# Patient Record
Sex: MALE | NOT HISPANIC OR LATINO | Employment: UNEMPLOYED | URBAN - METROPOLITAN AREA
[De-identification: names, ages, dates, MRNs, and addresses within clinical notes are randomized per-mention and may not be internally consistent; named-entity substitution may affect disease eponyms.]

---

## 2018-01-21 ENCOUNTER — HOSPITAL ENCOUNTER (EMERGENCY)
Facility: HOSPITAL | Age: 53
Discharge: HOME | End: 2018-01-21

## 2018-01-22 ENCOUNTER — HOSPITAL ENCOUNTER (EMERGENCY)
Facility: HOSPITAL | Age: 53
Discharge: HOME | End: 2018-01-22
Attending: EMERGENCY MEDICINE | Admitting: EMERGENCY MEDICINE

## 2018-01-22 ENCOUNTER — APPOINTMENT (OUTPATIENT)
Dept: GENERAL RADIOLOGY | Facility: HOSPITAL | Age: 53
End: 2018-01-22

## 2018-01-22 VITALS
TEMPERATURE: 97.9 F | HEART RATE: 77 BPM | OXYGEN SATURATION: 98 % | HEIGHT: 75 IN | RESPIRATION RATE: 18 BRPM | BODY MASS INDEX: 37.05 KG/M2 | SYSTOLIC BLOOD PRESSURE: 126 MMHG | DIASTOLIC BLOOD PRESSURE: 70 MMHG | WEIGHT: 298 LBS

## 2018-01-22 DIAGNOSIS — S51.002S ELBOW WOUND, LEFT, SEQUELA: Primary | ICD-10-CM

## 2018-01-22 PROCEDURE — 73080 X-RAY EXAM OF ELBOW: CPT

## 2018-01-22 PROCEDURE — 99283 EMERGENCY DEPT VISIT LOW MDM: CPT

## 2018-01-22 PROCEDURE — 99284 EMERGENCY DEPT VISIT MOD MDM: CPT | Performed by: EMERGENCY MEDICINE

## 2018-01-22 RX ORDER — TEMAZEPAM 15 MG/1
15 CAPSULE ORAL NIGHTLY PRN
COMMUNITY

## 2018-01-22 RX ORDER — TRAZODONE HYDROCHLORIDE 50 MG/1
50 TABLET ORAL NIGHTLY
COMMUNITY

## 2018-01-22 RX ORDER — CLINDAMYCIN HYDROCHLORIDE 300 MG/1
300 CAPSULE ORAL 4 TIMES DAILY
Qty: 40 CAPSULE | Refills: 0 | Status: SHIPPED | OUTPATIENT
Start: 2018-01-22 | End: 2018-02-02 | Stop reason: HOSPADM

## 2018-01-22 RX ORDER — SPIRONOLACTONE 100 MG/1
100 TABLET, FILM COATED ORAL DAILY
COMMUNITY
End: 2018-02-02 | Stop reason: HOSPADM

## 2018-01-22 RX ORDER — TAMSULOSIN HYDROCHLORIDE 0.4 MG/1
1 CAPSULE ORAL NIGHTLY
COMMUNITY

## 2018-01-22 RX ORDER — B-COMPLEX WITH VITAMIN C
100 TABLET ORAL DAILY
COMMUNITY

## 2018-01-22 RX ORDER — LEVOFLOXACIN 500 MG/1
500 TABLET, FILM COATED ORAL DAILY
Status: ON HOLD | COMMUNITY
End: 2018-01-27

## 2018-01-22 RX ORDER — GABAPENTIN 600 MG/1
600 TABLET ORAL NIGHTLY
COMMUNITY

## 2018-01-22 RX ORDER — PRENATAL VIT NO.126/IRON/FOLIC 28MG-0.8MG
TABLET ORAL DAILY
COMMUNITY

## 2018-01-22 RX ORDER — TORSEMIDE 20 MG/1
20 TABLET ORAL DAILY
COMMUNITY
End: 2018-02-02 | Stop reason: HOSPADM

## 2018-01-22 RX ORDER — PREDNISONE 10 MG/1
10 TABLET ORAL DAILY
Status: ON HOLD | COMMUNITY
End: 2018-01-27

## 2018-01-22 RX ORDER — THIAMINE MONONITRATE (VIT B1) 100 MG
100 TABLET ORAL DAILY
COMMUNITY

## 2018-01-22 NOTE — ED NOTES
Appt set up for Friday, 1/26 at 8:05am.  Address:  [address], [facility], [place] [address].  Phone [phone]     Satinder Miller  01/22/18 8927

## 2018-01-22 NOTE — ED PROVIDER NOTES
Subjective   History of Present Illness  History of Present Illness    Chief complaint: Elbow wound and possible gangrene    Location: Left olecranon    Quality/Severity:  Moderate    Timing/Duration: Initial injury occurred in November.    Modifying Factors: Patient has been admitted several times to [facility] for what sounds like cellulitis of the left elbow and received intravenous antibiotics.  Patient was discharged to rehabilitation and just recently went home.  Patient has been getting home health care where the wound has been packed with iodoform.  Last week the patient did have an outpatient x-ray that revealed subcutaneous emphysema.  There was some concern that this may be due to an infectious process.    Associated Symptoms: Redness and swelling    Narrative: The patient is a 53-year-old white male who presents as noted above.  Again, the patient initially fell in November and had a relatively minor wound to his left elbow.  From what can be ascertained the patient did develop a cellulitis and has been admitted to [facility] and received IV antibiotics.  After a 3 week stay in rehabilitation the patient is now home and is getting home health care help.  Last week and x-ray revealed some subcutaneous emphysema and there was some concern for an infectious process.  The patient himself does not relate any increased pain, erythema or swelling.  However, there has been a slight increase in yellow discharge.  No reported fever.    Review of Systems   Constitutional: Negative for activity change, appetite change, fatigue and fever.   Musculoskeletal: Positive for joint swelling (left elbow and with wound over the olecranon).   All other systems reviewed and are negative.      Past Medical History:   Diagnosis Date   • Chronic back pain    • Chronic neck pain    • Cirrhosis    • GERD (gastroesophageal reflux disease)    • Hypertension        Allergies   Allergen Reactions   • Lisinopril  Angioedema       Past Surgical History:   Procedure Laterality Date   • CATARACT EXTRACTION, BILATERAL     • COLONOSCOPY     • FINGER SURGERY Left     5TH FINGER   • TONSILLECTOMY         History reviewed. No pertinent family history.    Social History     Social History   • Marital status: Single     Spouse name: N/A   • Number of children: N/A   • Years of education: N/A     Social History Main Topics   • Smoking status: Never Smoker   • Smokeless tobacco: Current User     Types: Snuff   • Alcohol use No      Comment: heavy drinker that stopped in 10/2017   • Drug use: No   • Sexual activity: Defer     Other Topics Concern   • None     Social History Narrative   • None           Objective   Physical Exam   Constitutional: He is oriented to person, place, and time.   The patient is an obese, 53-year-old, white male in no acute distress.   HENT:   Head: Normocephalic and atraumatic.   Eyes: Conjunctivae and EOM are normal.   Musculoskeletal:   Over the left olecranon there is a 2.5 cm open wound that has an intact packing of iodoform gauze.  Mild erythema and heat associated with the wound that has been left to heal by secondary intention.  Full range of motion is maintained and the neuromuscular vascular exams are intact distally.   Neurological: He is alert and oriented to person, place, and time.   Nursing note and vitals reviewed.      Procedures  Xr Elbow 3+ View Left    Result Date: 1/22/2018  Narrative: INDICATION: Fall 3 months ago. Open wound the left elbow. Left elbow pain..  COMPARISON: None available.  FINDINGS: 3 views of the left elbow.  No fracture, dislocation, or effusion. There is some subcutaneous gas underlying a left elbow laceration. Patient has reported history of an open wound. No osseous erosions.  No foreign body.      Impression: Small volume subcutaneous gas associated with the superficial wound/laceration. The gas is fairly localized and does not track into the adjacent soft tissues.  Correlate for any evidence of abscess.  This report was finalized on 1/22/2018 12:01 PM by Dr. Arnie Parson MD.           ED Course  ED Course   Comment By Time   The packing was removed from the wound and a small amount of yellowish discharge was present.  No odor present.  Wound edges were pink and well granulated. Nils Pendleton MD 01/22 1400   Pertinent case findings were discussed with the general surgeon, , who agreed follow-up with this patient in her office.  Antibiotics will be changed to clindamycin.  I personally repacked the wound with a generous amount of quarter-inch iodoform gauze.  Wound care discussed with patient and significant other.  Warnings also discussed. Nils Pendleton MD 01/22 1402                  MDM  Number of Diagnoses or Management Options  Elbow wound, left, sequela: new and does not require workup     Amount and/or Complexity of Data Reviewed  Tests in the radiology section of CPT®: ordered and reviewed  Discuss the patient with other providers: yes  Independent visualization of images, tracings, or specimens: yes    Risk of Complications, Morbidity, and/or Mortality  Presenting problems: high  Diagnostic procedures: moderate  Management options: moderate    Patient Progress  Patient progress: stable    Labs this visit  Lab Results (last 24 hours)     ** No results found for the last 24 hours. **        Prescribed on discharge             Medication List      New Prescriptions          clindamycin 300 MG capsule   Commonly known as:  CLEOCIN   Take 1 capsule by mouth 4 (Four) Times a Day for 10 days.         Stop          amLODIPine 5 MG tablet   Commonly known as:  NORVASC       atenolol 100 MG tablet   Commonly known as:  TENORMIN       furosemide 20 MG tablet   Commonly known as:  LASIX       levoFLOXacin 500 MG tablet   Commonly known as:  LEVAQUIN       Loratadine 10 MG capsule       losartan 50 MG tablet   Commonly known as:  COZAAR        oxyCODONE-acetaminophen  MG per tablet   Commonly known as:  PERCOCET           All lab results, imaging results and other tests were reviewed by Nils Pendleton MD and unless otherwise specified were found to be unremarkable.      Final diagnoses:   Elbow wound, left, sequela            Nils Pendleton MD  01/22/18 140

## 2018-01-27 ENCOUNTER — HOSPITAL ENCOUNTER (INPATIENT)
Facility: HOSPITAL | Age: 53
LOS: 6 days | Discharge: NURSING, REHAB OR LONG-TERM CARE | End: 2018-02-02
Attending: EMERGENCY MEDICINE | Admitting: HOSPITALIST

## 2018-01-27 DIAGNOSIS — S51.002S ELBOW WOUND, LEFT, SEQUELA: ICD-10-CM

## 2018-01-27 DIAGNOSIS — R18.8 CIRRHOSIS OF LIVER WITH ASCITES, UNSPECIFIED HEPATIC CIRRHOSIS TYPE (HCC): ICD-10-CM

## 2018-01-27 DIAGNOSIS — E87.6 HYPOKALEMIA: ICD-10-CM

## 2018-01-27 DIAGNOSIS — E87.1 HYPONATREMIA: ICD-10-CM

## 2018-01-27 DIAGNOSIS — R19.7 DIARRHEA, UNSPECIFIED TYPE: Primary | ICD-10-CM

## 2018-01-27 DIAGNOSIS — K74.60 CIRRHOSIS OF LIVER WITH ASCITES, UNSPECIFIED HEPATIC CIRRHOSIS TYPE (HCC): ICD-10-CM

## 2018-01-27 LAB
ALBUMIN SERPL-MCNC: 2.2 G/DL (ref 3.5–5.2)
ALBUMIN/GLOB SERPL: 0.6 G/DL
ALP SERPL-CCNC: 207 U/L (ref 40–129)
ALT SERPL W P-5'-P-CCNC: 29 U/L (ref 5–41)
AMMONIA BLD-SCNC: 60 UMOL/L (ref 16–60)
ANION GAP SERPL CALCULATED.3IONS-SCNC: 10.8 MMOL/L
ANION GAP SERPL CALCULATED.3IONS-SCNC: 11.2 MMOL/L
ANISOCYTOSIS BLD QL: NORMAL
APTT PPP: 43.4 SECONDS (ref 24.3–38.1)
AST SERPL-CCNC: 61 U/L (ref 5–40)
BASOPHILS # BLD AUTO: 0.03 10*3/MM3 (ref 0–0.2)
BASOPHILS NFR BLD AUTO: 0.4 % (ref 0–2)
BILIRUB SERPL-MCNC: 5 MG/DL (ref 0.2–1.2)
BILIRUB UR QL STRIP: ABNORMAL
BUN BLD-MCNC: 15 MG/DL (ref 6–20)
BUN BLD-MCNC: 17 MG/DL (ref 6–20)
BUN/CREAT SERPL: 11.9 (ref 7–25)
BUN/CREAT SERPL: 12.6 (ref 7–25)
C DIFF GDH STL QL: NEGATIVE
CALCIUM SPEC-SCNC: 7.6 MG/DL (ref 8.6–10.5)
CALCIUM SPEC-SCNC: 7.7 MG/DL (ref 8.6–10.5)
CHLORIDE SERPL-SCNC: 90 MMOL/L (ref 98–107)
CHLORIDE SERPL-SCNC: 91 MMOL/L (ref 98–107)
CLARITY UR: CLEAR
CO2 SERPL-SCNC: 23.8 MMOL/L (ref 22–29)
CO2 SERPL-SCNC: 24.2 MMOL/L (ref 22–29)
COLOR UR: ABNORMAL
CREAT BLD-MCNC: 1.19 MG/DL (ref 0.76–1.27)
CREAT BLD-MCNC: 1.43 MG/DL (ref 0.76–1.27)
D-LACTATE SERPL-SCNC: 1.6 MMOL/L (ref 0.5–2)
D-LACTATE SERPL-SCNC: 2.1 MMOL/L (ref 0.5–2)
DEPRECATED RDW RBC AUTO: 63.6 FL (ref 37–54)
EOSINOPHIL # BLD AUTO: 0.16 10*3/MM3 (ref 0.1–0.3)
EOSINOPHIL NFR BLD AUTO: 2.3 % (ref 0–4)
ERYTHROCYTE [DISTWIDTH] IN BLOOD BY AUTOMATED COUNT: 19 % (ref 11.5–14.5)
ERYTHROCYTE [SEDIMENTATION RATE] IN BLOOD: 55 MM/HR (ref 0–20)
FLUAV AG NPH QL: NEGATIVE
FLUBV AG NPH QL IA: NEGATIVE
GFR SERPL CREATININE-BSD FRML MDRD: 52 ML/MIN/1.73
GFR SERPL CREATININE-BSD FRML MDRD: 64 ML/MIN/1.73
GLOBULIN UR ELPH-MCNC: 3.9 GM/DL
GLUCOSE BLD-MCNC: 134 MG/DL (ref 65–99)
GLUCOSE BLD-MCNC: 174 MG/DL (ref 65–99)
GLUCOSE UR STRIP-MCNC: NEGATIVE MG/DL
HCT VFR BLD AUTO: 26.9 % (ref 42–52)
HGB BLD-MCNC: 9.7 G/DL (ref 14–18)
HGB UR QL STRIP.AUTO: NEGATIVE
HOLD SPECIMEN: NORMAL
HYPOCHROMIA BLD QL: NORMAL
IMM GRANULOCYTES # BLD: 0.03 10*3/MM3 (ref 0–0.03)
IMM GRANULOCYTES NFR BLD: 0.4 % (ref 0–0.5)
INR PPP: 2.16 (ref 0.9–1.1)
KETONES UR QL STRIP: NEGATIVE
LARGE PLATELETS: NORMAL
LEUKOCYTE ESTERASE UR QL STRIP.AUTO: NEGATIVE
LYMPHOCYTES # BLD AUTO: 2.15 10*3/MM3 (ref 0.6–4.8)
LYMPHOCYTES NFR BLD AUTO: 31.3 % (ref 20–45)
MACROCYTES BLD QL SMEAR: NORMAL
MCH RBC QN AUTO: 32.7 PG (ref 27–31)
MCHC RBC AUTO-ENTMCNC: 36.1 G/DL (ref 31–37)
MCV RBC AUTO: 90.6 FL (ref 80–94)
MONOCYTES # BLD AUTO: 1.4 10*3/MM3 (ref 0–1)
MONOCYTES NFR BLD AUTO: 20.4 % (ref 3–8)
NEUTROPHILS # BLD AUTO: 3.09 10*3/MM3 (ref 1.5–8.3)
NEUTROPHILS NFR BLD AUTO: 45.2 % (ref 45–70)
NITRITE UR QL STRIP: NEGATIVE
NRBC BLD MANUAL-RTO: 0 /100 WBC (ref 0–0)
PH UR STRIP.AUTO: 6 [PH] (ref 4.5–8)
PLATELET # BLD AUTO: 76 10*3/MM3 (ref 140–500)
PMV BLD AUTO: 9.5 FL (ref 7.4–10.4)
POTASSIUM BLD-SCNC: 2.5 MMOL/L (ref 3.5–5.2)
POTASSIUM BLD-SCNC: 2.7 MMOL/L (ref 3.5–5.2)
PROT SERPL-MCNC: 6.1 G/DL (ref 6–8.5)
PROT UR QL STRIP: NEGATIVE
PROTHROMBIN TIME: 24.4 SECONDS (ref 12.1–15)
RBC # BLD AUTO: 2.97 10*6/MM3 (ref 4.7–6.1)
SMALL PLATELETS BLD QL SMEAR: NORMAL
SODIUM BLD-SCNC: 125 MMOL/L (ref 136–145)
SODIUM BLD-SCNC: 126 MMOL/L (ref 136–145)
SP GR UR STRIP: 1.01 (ref 1–1.03)
TARGETS BLD QL SMEAR: NORMAL
UROBILINOGEN UR QL STRIP: ABNORMAL
WBC MORPH BLD: NORMAL
WBC NRBC COR # BLD: 6.86 10*3/MM3 (ref 4.8–10.8)

## 2018-01-27 PROCEDURE — 87449 NOS EACH ORGANISM AG IA: CPT | Performed by: EMERGENCY MEDICINE

## 2018-01-27 PROCEDURE — 99253 IP/OBS CNSLTJ NEW/EST LOW 45: CPT | Performed by: SURGERY

## 2018-01-27 PROCEDURE — 85730 THROMBOPLASTIN TIME PARTIAL: CPT | Performed by: EMERGENCY MEDICINE

## 2018-01-27 PROCEDURE — 99291 CRITICAL CARE FIRST HOUR: CPT | Performed by: EMERGENCY MEDICINE

## 2018-01-27 PROCEDURE — 85610 PROTHROMBIN TIME: CPT | Performed by: EMERGENCY MEDICINE

## 2018-01-27 PROCEDURE — 87804 INFLUENZA ASSAY W/OPTIC: CPT | Performed by: EMERGENCY MEDICINE

## 2018-01-27 PROCEDURE — 81003 URINALYSIS AUTO W/O SCOPE: CPT | Performed by: EMERGENCY MEDICINE

## 2018-01-27 PROCEDURE — 25810000003 SODIUM CHLORIDE 0.9 % WITH KCL 40 MEQ/L 40-0.9 MEQ/L-% SOLUTION

## 2018-01-27 PROCEDURE — 87324 CLOSTRIDIUM AG IA: CPT | Performed by: EMERGENCY MEDICINE

## 2018-01-27 PROCEDURE — 25810000003 SODIUM CHLORIDE 0.9 % WITH KCL 40 MEQ/L 40-0.9 MEQ/L-% SOLUTION: Performed by: EMERGENCY MEDICINE

## 2018-01-27 PROCEDURE — 83605 ASSAY OF LACTIC ACID: CPT | Performed by: EMERGENCY MEDICINE

## 2018-01-27 PROCEDURE — 80053 COMPREHEN METABOLIC PANEL: CPT | Performed by: EMERGENCY MEDICINE

## 2018-01-27 PROCEDURE — 85025 COMPLETE CBC W/AUTO DIFF WBC: CPT | Performed by: EMERGENCY MEDICINE

## 2018-01-27 PROCEDURE — 85007 BL SMEAR W/DIFF WBC COUNT: CPT | Performed by: EMERGENCY MEDICINE

## 2018-01-27 PROCEDURE — 86140 C-REACTIVE PROTEIN: CPT | Performed by: HOSPITALIST

## 2018-01-27 PROCEDURE — 85652 RBC SED RATE AUTOMATED: CPT | Performed by: SURGERY

## 2018-01-27 PROCEDURE — 99284 EMERGENCY DEPT VISIT MOD MDM: CPT

## 2018-01-27 PROCEDURE — 25010000002 PIPERACILLIN-TAZOBACTAM: Performed by: HOSPITALIST

## 2018-01-27 PROCEDURE — 82140 ASSAY OF AMMONIA: CPT | Performed by: EMERGENCY MEDICINE

## 2018-01-27 PROCEDURE — 25010000002 VANCOMYCIN PER 500 MG: Performed by: HOSPITALIST

## 2018-01-27 PROCEDURE — 99222 1ST HOSP IP/OBS MODERATE 55: CPT | Performed by: INTERNAL MEDICINE

## 2018-01-27 RX ORDER — TEMAZEPAM 15 MG/1
15 CAPSULE ORAL NIGHTLY PRN
Status: DISCONTINUED | OUTPATIENT
Start: 2018-01-27 | End: 2018-02-02 | Stop reason: HOSPADM

## 2018-01-27 RX ORDER — SODIUM CHLORIDE AND POTASSIUM CHLORIDE 150; 450 MG/100ML; MG/100ML
100 INJECTION, SOLUTION INTRAVENOUS CONTINUOUS
Status: DISCONTINUED | OUTPATIENT
Start: 2018-01-27 | End: 2018-01-27

## 2018-01-27 RX ORDER — SODIUM CHLORIDE 9 MG/ML
INJECTION, SOLUTION INTRAVENOUS
Status: DISPENSED
Start: 2018-01-27 | End: 2018-01-28

## 2018-01-27 RX ORDER — SPIRONOLACTONE 100 MG/1
100 TABLET, FILM COATED ORAL DAILY
Status: DISCONTINUED | OUTPATIENT
Start: 2018-01-27 | End: 2018-01-30

## 2018-01-27 RX ORDER — TRAZODONE HYDROCHLORIDE 50 MG/1
50 TABLET ORAL NIGHTLY
Status: DISCONTINUED | OUTPATIENT
Start: 2018-01-27 | End: 2018-02-02 | Stop reason: HOSPADM

## 2018-01-27 RX ORDER — POTASSIUM CHLORIDE 20 MEQ/1
40 TABLET, EXTENDED RELEASE ORAL EVERY 4 HOURS
Status: COMPLETED | OUTPATIENT
Start: 2018-01-27 | End: 2018-01-27

## 2018-01-27 RX ORDER — PRENATAL VIT NO.126/IRON/FOLIC 28MG-0.8MG
1 TABLET ORAL DAILY
Status: DISCONTINUED | OUTPATIENT
Start: 2018-01-27 | End: 2018-01-28 | Stop reason: CLARIF

## 2018-01-27 RX ORDER — PANTOPRAZOLE SODIUM 40 MG/1
40 TABLET, DELAYED RELEASE ORAL DAILY
Status: DISCONTINUED | OUTPATIENT
Start: 2018-01-27 | End: 2018-02-02 | Stop reason: HOSPADM

## 2018-01-27 RX ORDER — SODIUM CHLORIDE AND POTASSIUM CHLORIDE 300; 900 MG/100ML; MG/100ML
100 INJECTION, SOLUTION INTRAVENOUS CONTINUOUS
Status: DISCONTINUED | OUTPATIENT
Start: 2018-01-27 | End: 2018-01-28

## 2018-01-27 RX ORDER — TAMSULOSIN HYDROCHLORIDE 0.4 MG/1
0.4 CAPSULE ORAL NIGHTLY
Status: DISCONTINUED | OUTPATIENT
Start: 2018-01-27 | End: 2018-02-02 | Stop reason: HOSPADM

## 2018-01-27 RX ORDER — SODIUM CHLORIDE AND POTASSIUM CHLORIDE 300; 900 MG/100ML; MG/100ML
INJECTION, SOLUTION INTRAVENOUS
Status: COMPLETED
Start: 2018-01-27 | End: 2018-01-27

## 2018-01-27 RX ORDER — LACTULOSE 20 G/30ML
10 SOLUTION ORAL 3 TIMES DAILY
Status: DISCONTINUED | OUTPATIENT
Start: 2018-01-27 | End: 2018-01-27

## 2018-01-27 RX ORDER — GABAPENTIN 300 MG/1
600 CAPSULE ORAL NIGHTLY
Status: DISCONTINUED | OUTPATIENT
Start: 2018-01-27 | End: 2018-02-02 | Stop reason: HOSPADM

## 2018-01-27 RX ORDER — LACTULOSE 10 G/15ML
10 SOLUTION ORAL 3 TIMES DAILY
COMMUNITY
End: 2018-02-02 | Stop reason: HOSPADM

## 2018-01-27 RX ORDER — VITAMIN B COMPLEX
1 CAPSULE ORAL DAILY
Status: DISCONTINUED | OUTPATIENT
Start: 2018-01-27 | End: 2018-01-28 | Stop reason: CLARIF

## 2018-01-27 RX ORDER — PIPERACILLIN SODIUM, TAZOBACTAM SODIUM 3; .375 G/15ML; G/15ML
INJECTION, POWDER, LYOPHILIZED, FOR SOLUTION INTRAVENOUS
Status: DISPENSED
Start: 2018-01-27 | End: 2018-01-28

## 2018-01-27 RX ORDER — FOLIC ACID 1 MG/1
1 TABLET ORAL DAILY
Status: DISCONTINUED | OUTPATIENT
Start: 2018-01-27 | End: 2018-02-02 | Stop reason: HOSPADM

## 2018-01-27 RX ORDER — SPIRONOLACTONE 25 MG/1
TABLET ORAL
Status: COMPLETED
Start: 2018-01-27 | End: 2018-01-27

## 2018-01-27 RX ORDER — SODIUM CHLORIDE 0.9 % (FLUSH) 0.9 %
1-10 SYRINGE (ML) INJECTION AS NEEDED
Status: DISCONTINUED | OUTPATIENT
Start: 2018-01-27 | End: 2018-02-02 | Stop reason: HOSPADM

## 2018-01-27 RX ORDER — TORSEMIDE 20 MG/1
20 TABLET ORAL DAILY
Status: DISCONTINUED | OUTPATIENT
Start: 2018-01-27 | End: 2018-01-28

## 2018-01-27 RX ORDER — SODIUM CHLORIDE 9 MG/ML
40 INJECTION, SOLUTION INTRAVENOUS AS NEEDED
Status: DISCONTINUED | OUTPATIENT
Start: 2018-01-27 | End: 2018-02-02 | Stop reason: HOSPADM

## 2018-01-27 RX ORDER — SODIUM CHLORIDE 0.9 % (FLUSH) 0.9 %
10 SYRINGE (ML) INJECTION AS NEEDED
Status: DISCONTINUED | OUTPATIENT
Start: 2018-01-27 | End: 2018-02-02 | Stop reason: HOSPADM

## 2018-01-27 RX ORDER — TORSEMIDE 10 MG/1
TABLET ORAL
Status: COMPLETED
Start: 2018-01-27 | End: 2018-01-27

## 2018-01-27 RX ORDER — THIAMINE MONONITRATE (VIT B1) 100 MG
100 TABLET ORAL DAILY
Status: DISCONTINUED | OUTPATIENT
Start: 2018-01-27 | End: 2018-02-02 | Stop reason: HOSPADM

## 2018-01-27 RX ADMIN — TAMSULOSIN HYDROCHLORIDE 0.4 MG: 0.4 CAPSULE ORAL at 20:38

## 2018-01-27 RX ADMIN — TORSEMIDE 20 MG: 10 TABLET ORAL at 18:47

## 2018-01-27 RX ADMIN — TORSEMIDE 20 MG: 20 TABLET ORAL at 18:47

## 2018-01-27 RX ADMIN — POTASSIUM CHLORIDE AND SODIUM CHLORIDE 100 ML/HR: 900; 300 INJECTION, SOLUTION INTRAVENOUS at 20:25

## 2018-01-27 RX ADMIN — SODIUM CHLORIDE AND POTASSIUM CHLORIDE 100 ML/HR: 9; 2.98 INJECTION, SOLUTION INTRAVENOUS at 09:32

## 2018-01-27 RX ADMIN — GABAPENTIN 600 MG: 300 CAPSULE ORAL at 20:25

## 2018-01-27 RX ADMIN — SPIRONOLACTONE 100 MG: 100 TABLET, FILM COATED ORAL at 18:48

## 2018-01-27 RX ADMIN — PIPERACILLIN SODIUM,TAZOBACTAM SODIUM 3.38 G: 3; .375 INJECTION, POWDER, FOR SOLUTION INTRAVENOUS at 20:11

## 2018-01-27 RX ADMIN — SPIRONOLACTONE 100 MG: 25 TABLET ORAL at 18:48

## 2018-01-27 RX ADMIN — Medication 100 MG: at 18:49

## 2018-01-27 RX ADMIN — POTASSIUM CHLORIDE 40 MEQ: 1500 TABLET, EXTENDED RELEASE ORAL at 23:25

## 2018-01-27 RX ADMIN — POTASSIUM CHLORIDE 40 MEQ: 1500 TABLET, EXTENDED RELEASE ORAL at 18:49

## 2018-01-27 RX ADMIN — SODIUM CHLORIDE AND POTASSIUM CHLORIDE 100 ML/HR: 9; 2.98 INJECTION, SOLUTION INTRAVENOUS at 20:25

## 2018-01-27 RX ADMIN — FOLIC ACID 1 MG: 1 TABLET ORAL at 18:49

## 2018-01-27 RX ADMIN — PANTOPRAZOLE SODIUM 40 MG: 40 TABLET, DELAYED RELEASE ORAL at 18:49

## 2018-01-27 RX ADMIN — TRAZODONE HYDROCHLORIDE 50 MG: 50 TABLET ORAL at 20:38

## 2018-01-27 RX ADMIN — VANCOMYCIN HYDROCHLORIDE 2750 MG: 1 INJECTION, POWDER, LYOPHILIZED, FOR SOLUTION INTRAVENOUS at 21:05

## 2018-01-28 LAB
ALBUMIN SERPL-MCNC: 2.1 G/DL (ref 3.5–5.2)
ALBUMIN/GLOB SERPL: 0.5 G/DL
ALP SERPL-CCNC: 215 U/L (ref 40–129)
ALT SERPL W P-5'-P-CCNC: 29 U/L (ref 5–41)
ANION GAP SERPL CALCULATED.3IONS-SCNC: 12.7 MMOL/L
AST SERPL-CCNC: 60 U/L (ref 5–40)
BASOPHILS # BLD AUTO: 0.05 10*3/MM3 (ref 0–0.2)
BASOPHILS NFR BLD AUTO: 0.6 % (ref 0–2)
BILIRUB SERPL-MCNC: 5 MG/DL (ref 0.2–1.2)
BUN BLD-MCNC: 18 MG/DL (ref 6–20)
BUN/CREAT SERPL: 12.5 (ref 7–25)
CALCIUM SPEC-SCNC: 7.7 MG/DL (ref 8.6–10.5)
CHLORIDE SERPL-SCNC: 92 MMOL/L (ref 98–107)
CO2 SERPL-SCNC: 22.3 MMOL/L (ref 22–29)
CREAT BLD-MCNC: 1.44 MG/DL (ref 0.76–1.27)
CRP SERPL-MCNC: 2.86 MG/DL (ref 0–0.5)
DEPRECATED RDW RBC AUTO: 62.3 FL (ref 37–54)
EOSINOPHIL # BLD AUTO: 0.16 10*3/MM3 (ref 0.1–0.3)
EOSINOPHIL NFR BLD AUTO: 1.9 % (ref 0–4)
ERYTHROCYTE [DISTWIDTH] IN BLOOD BY AUTOMATED COUNT: 18.8 % (ref 11.5–14.5)
GFR SERPL CREATININE-BSD FRML MDRD: 52 ML/MIN/1.73
GLOBULIN UR ELPH-MCNC: 3.9 GM/DL
GLUCOSE BLD-MCNC: 158 MG/DL (ref 65–99)
HCT VFR BLD AUTO: 27.1 % (ref 42–52)
HGB BLD-MCNC: 9.7 G/DL (ref 14–18)
IMM GRANULOCYTES # BLD: 0.06 10*3/MM3 (ref 0–0.03)
IMM GRANULOCYTES NFR BLD: 0.7 % (ref 0–0.5)
LYMPHOCYTES # BLD AUTO: 2.85 10*3/MM3 (ref 0.6–4.8)
LYMPHOCYTES NFR BLD AUTO: 33.3 % (ref 20–45)
MAGNESIUM SERPL-MCNC: 1.7 MG/DL (ref 1.7–2.5)
MCH RBC QN AUTO: 32.8 PG (ref 27–31)
MCHC RBC AUTO-ENTMCNC: 35.8 G/DL (ref 31–37)
MCV RBC AUTO: 91.6 FL (ref 80–94)
MONOCYTES # BLD AUTO: 1.19 10*3/MM3 (ref 0–1)
MONOCYTES NFR BLD AUTO: 13.9 % (ref 3–8)
NEUTROPHILS # BLD AUTO: 4.26 10*3/MM3 (ref 1.5–8.3)
NEUTROPHILS NFR BLD AUTO: 49.6 % (ref 45–70)
NRBC BLD MANUAL-RTO: 0 /100 WBC (ref 0–0)
PHOSPHATE SERPL-MCNC: 2.5 MG/DL (ref 2.7–4.5)
PLATELET # BLD AUTO: 75 10*3/MM3 (ref 140–500)
PMV BLD AUTO: 10.2 FL (ref 7.4–10.4)
POTASSIUM BLD-SCNC: 3.4 MMOL/L (ref 3.5–5.2)
PROT SERPL-MCNC: 6 G/DL (ref 6–8.5)
RBC # BLD AUTO: 2.96 10*6/MM3 (ref 4.7–6.1)
SODIUM BLD-SCNC: 127 MMOL/L (ref 136–145)
WBC NRBC COR # BLD: 8.57 10*3/MM3 (ref 4.8–10.8)

## 2018-01-28 PROCEDURE — 25010000002 MAGNESIUM SULFATE 2 GM/50ML SOLUTION: Performed by: INTERNAL MEDICINE

## 2018-01-28 PROCEDURE — 84100 ASSAY OF PHOSPHORUS: CPT | Performed by: HOSPITALIST

## 2018-01-28 PROCEDURE — 25010000002 PIPERACILLIN-TAZOBACTAM: Performed by: HOSPITALIST

## 2018-01-28 PROCEDURE — 85025 COMPLETE CBC W/AUTO DIFF WBC: CPT | Performed by: HOSPITALIST

## 2018-01-28 PROCEDURE — 25010000002 FUROSEMIDE PER 20 MG: Performed by: INTERNAL MEDICINE

## 2018-01-28 PROCEDURE — 83735 ASSAY OF MAGNESIUM: CPT | Performed by: HOSPITALIST

## 2018-01-28 PROCEDURE — 80053 COMPREHEN METABOLIC PANEL: CPT | Performed by: HOSPITALIST

## 2018-01-28 PROCEDURE — 99232 SBSQ HOSP IP/OBS MODERATE 35: CPT | Performed by: INTERNAL MEDICINE

## 2018-01-28 PROCEDURE — 25810000003 SODIUM CHLORIDE 0.9 % WITH KCL 40 MEQ/L 40-0.9 MEQ/L-% SOLUTION: Performed by: EMERGENCY MEDICINE

## 2018-01-28 RX ORDER — POTASSIUM CHLORIDE 20 MEQ/1
40 TABLET, EXTENDED RELEASE ORAL AS NEEDED
Status: DISCONTINUED | OUTPATIENT
Start: 2018-01-28 | End: 2018-02-02 | Stop reason: HOSPADM

## 2018-01-28 RX ORDER — MAGNESIUM SULFATE HEPTAHYDRATE 40 MG/ML
2 INJECTION, SOLUTION INTRAVENOUS AS NEEDED
Status: DISCONTINUED | OUTPATIENT
Start: 2018-01-28 | End: 2018-02-02 | Stop reason: HOSPADM

## 2018-01-28 RX ORDER — MAGNESIUM SULFATE HEPTAHYDRATE 40 MG/ML
2 INJECTION, SOLUTION INTRAVENOUS ONCE
Status: COMPLETED | OUTPATIENT
Start: 2018-01-28 | End: 2018-01-28

## 2018-01-28 RX ORDER — PRENATAL VIT/IRON FUM/FOLIC AC 27MG-0.8MG
1 TABLET ORAL DAILY
Status: DISCONTINUED | OUTPATIENT
Start: 2018-01-28 | End: 2018-02-02 | Stop reason: HOSPADM

## 2018-01-28 RX ORDER — FUROSEMIDE 10 MG/ML
40 INJECTION INTRAMUSCULAR; INTRAVENOUS ONCE
Status: COMPLETED | OUTPATIENT
Start: 2018-01-28 | End: 2018-01-28

## 2018-01-28 RX ORDER — TORSEMIDE 20 MG/1
40 TABLET ORAL DAILY
Status: DISCONTINUED | OUTPATIENT
Start: 2018-01-29 | End: 2018-01-29

## 2018-01-28 RX ORDER — MAGNESIUM SULFATE HEPTAHYDRATE 40 MG/ML
4 INJECTION, SOLUTION INTRAVENOUS AS NEEDED
Status: DISCONTINUED | OUTPATIENT
Start: 2018-01-28 | End: 2018-02-02 | Stop reason: HOSPADM

## 2018-01-28 RX ORDER — POTASSIUM CHLORIDE 7.45 MG/ML
10 INJECTION INTRAVENOUS
Status: DISCONTINUED | OUTPATIENT
Start: 2018-01-28 | End: 2018-02-02 | Stop reason: HOSPADM

## 2018-01-28 RX ORDER — PIPERACILLIN SODIUM, TAZOBACTAM SODIUM 3; .375 G/15ML; G/15ML
INJECTION, POWDER, LYOPHILIZED, FOR SOLUTION INTRAVENOUS
Status: DISPENSED
Start: 2018-01-28 | End: 2018-01-28

## 2018-01-28 RX ORDER — POTASSIUM CHLORIDE 20 MEQ/1
40 TABLET, EXTENDED RELEASE ORAL ONCE
Status: COMPLETED | OUTPATIENT
Start: 2018-01-28 | End: 2018-01-28

## 2018-01-28 RX ORDER — MAGNESIUM SULFATE 1 G/100ML
1 INJECTION INTRAVENOUS AS NEEDED
Status: DISCONTINUED | OUTPATIENT
Start: 2018-01-28 | End: 2018-02-02 | Stop reason: HOSPADM

## 2018-01-28 RX ORDER — POTASSIUM CHLORIDE 1.5 G/1.77G
40 POWDER, FOR SOLUTION ORAL AS NEEDED
Status: DISCONTINUED | OUTPATIENT
Start: 2018-01-28 | End: 2018-02-02 | Stop reason: HOSPADM

## 2018-01-28 RX ORDER — SODIUM CHLORIDE 9 MG/ML
INJECTION, SOLUTION INTRAVENOUS
Status: DISPENSED
Start: 2018-01-28 | End: 2018-01-28

## 2018-01-28 RX ADMIN — TORSEMIDE 20 MG: 20 TABLET ORAL at 08:29

## 2018-01-28 RX ADMIN — FOLIC ACID 1 MG: 1 TABLET ORAL at 08:29

## 2018-01-28 RX ADMIN — PIPERACILLIN SODIUM,TAZOBACTAM SODIUM 3.38 G: 3; .375 INJECTION, POWDER, FOR SOLUTION INTRAVENOUS at 11:44

## 2018-01-28 RX ADMIN — ASCORBIC ACID, THIAMINE MONONITRATE,RIBOFLAVIN, NIACINAMIDE, PYRIDOXINE HYDROCHLORIDE, FOLIC ACID, CYANOCOBALAMIN, BIOTIN, CALCIUM PANTOTHENATE, 1 MG: 100; 1.5; 1.7; 20; 10; 1; 6000; 150000; 5 CAPSULE, LIQUID FILLED ORAL at 08:29

## 2018-01-28 RX ADMIN — PIPERACILLIN SODIUM,TAZOBACTAM SODIUM 3.38 G: 3; .375 INJECTION, POWDER, FOR SOLUTION INTRAVENOUS at 03:20

## 2018-01-28 RX ADMIN — POTASSIUM PHOSPHATE, MONOBASIC AND POTASSIUM PHOSPHATE, DIBASIC 15 MMOL: 224; 236 INJECTION, SOLUTION INTRAVENOUS at 23:10

## 2018-01-28 RX ADMIN — RIFAXIMIN 600 MG: 200 TABLET ORAL at 08:29

## 2018-01-28 RX ADMIN — MAGNESIUM SULFATE HEPTAHYDRATE 2 G: 40 INJECTION, SOLUTION INTRAVENOUS at 21:14

## 2018-01-28 RX ADMIN — Medication 100 MG: at 08:29

## 2018-01-28 RX ADMIN — POTASSIUM CHLORIDE 40 MEQ: 1500 TABLET, EXTENDED RELEASE ORAL at 21:22

## 2018-01-28 RX ADMIN — SPIRONOLACTONE 100 MG: 100 TABLET, FILM COATED ORAL at 08:29

## 2018-01-28 RX ADMIN — FUROSEMIDE 40 MG: 10 INJECTION, SOLUTION INTRAMUSCULAR; INTRAVENOUS at 21:22

## 2018-01-28 RX ADMIN — POTASSIUM CHLORIDE 40 MEQ: 1500 TABLET, EXTENDED RELEASE ORAL at 18:42

## 2018-01-28 RX ADMIN — SODIUM CHLORIDE AND POTASSIUM CHLORIDE 100 ML/HR: 9; 2.98 INJECTION, SOLUTION INTRAVENOUS at 14:02

## 2018-01-28 RX ADMIN — TRAZODONE HYDROCHLORIDE 50 MG: 50 TABLET ORAL at 21:23

## 2018-01-28 RX ADMIN — RIFAXIMIN 600 MG: 200 TABLET ORAL at 21:23

## 2018-01-28 RX ADMIN — PANTOPRAZOLE SODIUM 40 MG: 40 TABLET, DELAYED RELEASE ORAL at 08:29

## 2018-01-28 RX ADMIN — GABAPENTIN 600 MG: 300 CAPSULE ORAL at 21:22

## 2018-01-28 RX ADMIN — PIPERACILLIN SODIUM,TAZOBACTAM SODIUM 3.38 G: 3; .375 INJECTION, POWDER, FOR SOLUTION INTRAVENOUS at 17:32

## 2018-01-28 RX ADMIN — PRENATAL VIT W/ FE FUMARATE-FA TAB 27-0.8 MG 1 TABLET: 27-0.8 TAB at 08:29

## 2018-01-28 RX ADMIN — TAMSULOSIN HYDROCHLORIDE 0.4 MG: 0.4 CAPSULE ORAL at 21:23

## 2018-01-29 ENCOUNTER — APPOINTMENT (OUTPATIENT)
Dept: MRI IMAGING | Facility: HOSPITAL | Age: 53
End: 2018-01-29

## 2018-01-29 ENCOUNTER — APPOINTMENT (OUTPATIENT)
Dept: GENERAL RADIOLOGY | Facility: HOSPITAL | Age: 53
End: 2018-01-29

## 2018-01-29 ENCOUNTER — TELEPHONE (OUTPATIENT)
Dept: ORTHOPEDIC SURGERY | Facility: CLINIC | Age: 53
End: 2018-01-29

## 2018-01-29 LAB
MAGNESIUM SERPL-MCNC: 1.7 MG/DL (ref 1.7–2.5)
PHOSPHATE SERPL-MCNC: 2.4 MG/DL (ref 2.7–4.5)
POTASSIUM BLD-SCNC: 4 MMOL/L (ref 3.5–5.2)

## 2018-01-29 PROCEDURE — 25010000002 FUROSEMIDE PER 20 MG: Performed by: INTERNAL MEDICINE

## 2018-01-29 PROCEDURE — 25010000002 MAGNESIUM SULFATE 2 GM/50ML SOLUTION: Performed by: HOSPITALIST

## 2018-01-29 PROCEDURE — 99232 SBSQ HOSP IP/OBS MODERATE 35: CPT | Performed by: INTERNAL MEDICINE

## 2018-01-29 PROCEDURE — C1751 CATH, INF, PER/CENT/MIDLINE: HCPCS

## 2018-01-29 PROCEDURE — 87205 SMEAR GRAM STAIN: CPT | Performed by: INTERNAL MEDICINE

## 2018-01-29 PROCEDURE — 25010000002 VANCOMYCIN PER 500 MG: Performed by: HOSPITALIST

## 2018-01-29 PROCEDURE — 83735 ASSAY OF MAGNESIUM: CPT | Performed by: HOSPITALIST

## 2018-01-29 PROCEDURE — 25010000002 PIPERACILLIN-TAZOBACTAM: Performed by: HOSPITALIST

## 2018-01-29 PROCEDURE — 02HV33Z INSERTION OF INFUSION DEVICE INTO SUPERIOR VENA CAVA, PERCUTANEOUS APPROACH: ICD-10-PCS | Performed by: INTERNAL MEDICINE

## 2018-01-29 PROCEDURE — 71045 X-RAY EXAM CHEST 1 VIEW: CPT

## 2018-01-29 PROCEDURE — 87070 CULTURE OTHR SPECIMN AEROBIC: CPT | Performed by: INTERNAL MEDICINE

## 2018-01-29 PROCEDURE — 84132 ASSAY OF SERUM POTASSIUM: CPT | Performed by: HOSPITALIST

## 2018-01-29 PROCEDURE — 84100 ASSAY OF PHOSPHORUS: CPT | Performed by: HOSPITALIST

## 2018-01-29 RX ORDER — FUROSEMIDE 10 MG/ML
40 INJECTION INTRAMUSCULAR; INTRAVENOUS EVERY 12 HOURS
Status: DISCONTINUED | OUTPATIENT
Start: 2018-01-29 | End: 2018-02-02

## 2018-01-29 RX ORDER — ZINC SULFATE 50(220)MG
220 CAPSULE ORAL 2 TIMES DAILY
Status: DISCONTINUED | OUTPATIENT
Start: 2018-01-29 | End: 2018-02-02 | Stop reason: HOSPADM

## 2018-01-29 RX ORDER — LACTULOSE 20 G/30ML
20 SOLUTION ORAL 2 TIMES DAILY
Status: DISCONTINUED | OUTPATIENT
Start: 2018-01-29 | End: 2018-01-31

## 2018-01-29 RX ADMIN — ASCORBIC ACID, THIAMINE MONONITRATE,RIBOFLAVIN, NIACINAMIDE, PYRIDOXINE HYDROCHLORIDE, FOLIC ACID, CYANOCOBALAMIN, BIOTIN, CALCIUM PANTOTHENATE, 1 MG: 100; 1.5; 1.7; 20; 10; 1; 6000; 150000; 5 CAPSULE, LIQUID FILLED ORAL at 10:03

## 2018-01-29 RX ADMIN — TEMAZEPAM 15 MG: 15 CAPSULE ORAL at 00:16

## 2018-01-29 RX ADMIN — LACTULOSE 20 G: 20 SOLUTION ORAL at 21:38

## 2018-01-29 RX ADMIN — Medication 100 MG: at 10:03

## 2018-01-29 RX ADMIN — PANTOPRAZOLE SODIUM 40 MG: 40 TABLET, DELAYED RELEASE ORAL at 10:04

## 2018-01-29 RX ADMIN — TRAZODONE HYDROCHLORIDE 50 MG: 50 TABLET ORAL at 21:31

## 2018-01-29 RX ADMIN — GABAPENTIN 600 MG: 300 CAPSULE ORAL at 21:38

## 2018-01-29 RX ADMIN — RIFAXIMIN 600 MG: 200 TABLET ORAL at 10:03

## 2018-01-29 RX ADMIN — TAMSULOSIN HYDROCHLORIDE 0.4 MG: 0.4 CAPSULE ORAL at 21:31

## 2018-01-29 RX ADMIN — POTASSIUM PHOSPHATE, MONOBASIC AND POTASSIUM PHOSPHATE, DIBASIC 15 MMOL: 224; 236 INJECTION, SOLUTION INTRAVENOUS at 13:31

## 2018-01-29 RX ADMIN — TORSEMIDE 40 MG: 20 TABLET ORAL at 10:04

## 2018-01-29 RX ADMIN — VANCOMYCIN HYDROCHLORIDE 2500 MG: 1 INJECTION, POWDER, LYOPHILIZED, FOR SOLUTION INTRAVENOUS at 02:02

## 2018-01-29 RX ADMIN — MAGNESIUM SULFATE HEPTAHYDRATE 2 G: 40 INJECTION, SOLUTION INTRAVENOUS at 11:00

## 2018-01-29 RX ADMIN — FOLIC ACID 1 MG: 1 TABLET ORAL at 10:04

## 2018-01-29 RX ADMIN — PRENATAL VIT W/ FE FUMARATE-FA TAB 27-0.8 MG 1 TABLET: 27-0.8 TAB at 10:03

## 2018-01-29 RX ADMIN — PIPERACILLIN SODIUM,TAZOBACTAM SODIUM 3.38 G: 3; .375 INJECTION, POWDER, FOR SOLUTION INTRAVENOUS at 21:24

## 2018-01-29 RX ADMIN — FUROSEMIDE 40 MG: 10 INJECTION, SOLUTION INTRAMUSCULAR; INTRAVENOUS at 21:38

## 2018-01-29 RX ADMIN — PIPERACILLIN SODIUM,TAZOBACTAM SODIUM 3.38 G: 3; .375 INJECTION, POWDER, FOR SOLUTION INTRAVENOUS at 06:02

## 2018-01-29 RX ADMIN — RIFAXIMIN 600 MG: 200 TABLET ORAL at 21:31

## 2018-01-29 RX ADMIN — SPIRONOLACTONE 100 MG: 100 TABLET, FILM COATED ORAL at 10:04

## 2018-01-30 ENCOUNTER — INPATIENT HOSPITAL (OUTPATIENT)
Dept: URBAN - METROPOLITAN AREA HOSPITAL 27 | Facility: HOSPITAL | Age: 53
End: 2018-01-30
Payer: COMMERCIAL

## 2018-01-30 ENCOUNTER — APPOINTMENT (OUTPATIENT)
Dept: ULTRASOUND IMAGING | Facility: HOSPITAL | Age: 53
End: 2018-01-30

## 2018-01-30 ENCOUNTER — APPOINTMENT (OUTPATIENT)
Dept: CT IMAGING | Facility: HOSPITAL | Age: 53
End: 2018-01-30

## 2018-01-30 DIAGNOSIS — K70.11 ALCOHOLIC HEPATITIS WITH ASCITES: ICD-10-CM

## 2018-01-30 LAB
AMMONIA BLD-SCNC: 76 UMOL/L (ref 16–60)
ANION GAP SERPL CALCULATED.3IONS-SCNC: 10.8 MMOL/L
BASOPHILS # BLD AUTO: 0.06 10*3/MM3 (ref 0–0.2)
BASOPHILS NFR BLD AUTO: 0.7 % (ref 0–2)
BUN BLD-MCNC: 17 MG/DL (ref 6–20)
BUN/CREAT SERPL: 14.2 (ref 7–25)
CALCIUM SPEC-SCNC: 7.5 MG/DL (ref 8.6–10.5)
CHLORIDE SERPL-SCNC: 98 MMOL/L (ref 98–107)
CO2 SERPL-SCNC: 22.2 MMOL/L (ref 22–29)
CREAT BLD-MCNC: 1.2 MG/DL (ref 0.76–1.27)
DEPRECATED RDW RBC AUTO: 63.4 FL (ref 37–54)
EOSINOPHIL # BLD AUTO: 0.16 10*3/MM3 (ref 0.1–0.3)
EOSINOPHIL NFR BLD AUTO: 1.8 % (ref 0–4)
ERYTHROCYTE [DISTWIDTH] IN BLOOD BY AUTOMATED COUNT: 19.1 % (ref 11.5–14.5)
GFR SERPL CREATININE-BSD FRML MDRD: 64 ML/MIN/1.73
GLUCOSE BLD-MCNC: 101 MG/DL (ref 65–99)
HCT VFR BLD AUTO: 25.5 % (ref 42–52)
HGB BLD-MCNC: 9.1 G/DL (ref 14–18)
IMM GRANULOCYTES # BLD: 0.05 10*3/MM3 (ref 0–0.03)
IMM GRANULOCYTES NFR BLD: 0.6 % (ref 0–0.5)
LYMPHOCYTES # BLD AUTO: 3.59 10*3/MM3 (ref 0.6–4.8)
LYMPHOCYTES NFR BLD AUTO: 40.8 % (ref 20–45)
MAGNESIUM SERPL-MCNC: 1.9 MG/DL (ref 1.7–2.5)
MCH RBC QN AUTO: 32.4 PG (ref 27–31)
MCHC RBC AUTO-ENTMCNC: 35.7 G/DL (ref 31–37)
MCV RBC AUTO: 90.7 FL (ref 80–94)
MONOCYTES # BLD AUTO: 1.15 10*3/MM3 (ref 0–1)
MONOCYTES NFR BLD AUTO: 13.1 % (ref 3–8)
NEUTROPHILS # BLD AUTO: 3.79 10*3/MM3 (ref 1.5–8.3)
NEUTROPHILS NFR BLD AUTO: 43 % (ref 45–70)
NRBC BLD MANUAL-RTO: 0 /100 WBC (ref 0–0)
PHOSPHATE SERPL-MCNC: 2.7 MG/DL (ref 2.7–4.5)
PLATELET # BLD AUTO: 77 10*3/MM3 (ref 140–500)
PMV BLD AUTO: 9.8 FL (ref 7.4–10.4)
POTASSIUM BLD-SCNC: 4 MMOL/L (ref 3.5–5.2)
RBC # BLD AUTO: 2.81 10*6/MM3 (ref 4.7–6.1)
SODIUM BLD-SCNC: 131 MMOL/L (ref 136–145)
VANCOMYCIN SERPL-MCNC: 6.3 MCG/ML (ref 5–40)
WBC NRBC COR # BLD: 8.8 10*3/MM3 (ref 4.8–10.8)

## 2018-01-30 PROCEDURE — 25010000002 PIPERACILLIN-TAZOBACTAM: Performed by: HOSPITALIST

## 2018-01-30 PROCEDURE — 73201 CT UPPER EXTREMITY W/DYE: CPT

## 2018-01-30 PROCEDURE — 94799 UNLISTED PULMONARY SVC/PX: CPT

## 2018-01-30 PROCEDURE — 80048 BASIC METABOLIC PNL TOTAL CA: CPT | Performed by: INTERNAL MEDICINE

## 2018-01-30 PROCEDURE — 85025 COMPLETE CBC W/AUTO DIFF WBC: CPT | Performed by: INTERNAL MEDICINE

## 2018-01-30 PROCEDURE — 99232 SBSQ HOSP IP/OBS MODERATE 35: CPT | Performed by: HOSPITALIST

## 2018-01-30 PROCEDURE — 83735 ASSAY OF MAGNESIUM: CPT | Performed by: INTERNAL MEDICINE

## 2018-01-30 PROCEDURE — 25010000002 FUROSEMIDE PER 20 MG: Performed by: INTERNAL MEDICINE

## 2018-01-30 PROCEDURE — 25010000002 VANCOMYCIN PER 500 MG: Performed by: HOSPITALIST

## 2018-01-30 PROCEDURE — 76700 US EXAM ABDOM COMPLETE: CPT

## 2018-01-30 PROCEDURE — 82140 ASSAY OF AMMONIA: CPT | Performed by: INTERNAL MEDICINE

## 2018-01-30 PROCEDURE — 25010000002 VANCOMYCIN 1500 MG/250ML SOLUTION: Performed by: HOSPITALIST

## 2018-01-30 PROCEDURE — 25010000002 VANCOMYCIN PER 500 MG

## 2018-01-30 PROCEDURE — 80202 ASSAY OF VANCOMYCIN: CPT | Performed by: HOSPITALIST

## 2018-01-30 PROCEDURE — 99223 1ST HOSP IP/OBS HIGH 75: CPT

## 2018-01-30 PROCEDURE — 0 IOPAMIDOL PER 1 ML: Performed by: HOSPITALIST

## 2018-01-30 PROCEDURE — 84100 ASSAY OF PHOSPHORUS: CPT | Performed by: HOSPITALIST

## 2018-01-30 RX ORDER — VANCOMYCIN HCL-SODIUM CHLORIDE IV SOLN 1.5 GM/250ML-0.9% 1.5-0.9/25 GM/ML-%
1500 SOLUTION INTRAVENOUS EVERY 8 HOURS
Status: DISCONTINUED | OUTPATIENT
Start: 2018-01-30 | End: 2018-01-31 | Stop reason: DRUGHIGH

## 2018-01-30 RX ORDER — SPIRONOLACTONE 100 MG/1
200 TABLET, FILM COATED ORAL DAILY
Status: DISCONTINUED | OUTPATIENT
Start: 2018-01-31 | End: 2018-02-02 | Stop reason: HOSPADM

## 2018-01-30 RX ORDER — CHLORDIAZEPOXIDE HYDROCHLORIDE 25 MG/1
25 CAPSULE, GELATIN COATED ORAL EVERY 8 HOURS PRN
Status: DISCONTINUED | OUTPATIENT
Start: 2018-01-30 | End: 2018-02-02 | Stop reason: HOSPADM

## 2018-01-30 RX ORDER — VANCOMYCIN HYDROCHLORIDE 500 MG/10ML
INJECTION, POWDER, LYOPHILIZED, FOR SOLUTION INTRAVENOUS
Status: COMPLETED
Start: 2018-01-30 | End: 2018-01-30

## 2018-01-30 RX ORDER — SODIUM CHLORIDE 9 MG/ML
INJECTION, SOLUTION INTRAVENOUS
Status: DISPENSED
Start: 2018-01-30 | End: 2018-01-30

## 2018-01-30 RX ADMIN — VANCOMYCIN HCL-SODIUM CHLORIDE IV SOLN 1.5 GM/250ML-0.9% 1500 MG: 1.5-0.9/25 SOLUTION at 18:50

## 2018-01-30 RX ADMIN — RIFAXIMIN 600 MG: 200 TABLET ORAL at 21:20

## 2018-01-30 RX ADMIN — IOPAMIDOL 100 ML: 755 INJECTION, SOLUTION INTRAVENOUS at 14:45

## 2018-01-30 RX ADMIN — PANTOPRAZOLE SODIUM 40 MG: 40 TABLET, DELAYED RELEASE ORAL at 12:46

## 2018-01-30 RX ADMIN — LACTULOSE 20 G: 20 SOLUTION ORAL at 21:20

## 2018-01-30 RX ADMIN — ZINC SULFATE CAP 220 MG (50 MG ELEMENTAL ZN) 220 MG: 220 (50 ZN) CAP at 12:50

## 2018-01-30 RX ADMIN — FUROSEMIDE 40 MG: 10 INJECTION, SOLUTION INTRAMUSCULAR; INTRAVENOUS at 12:51

## 2018-01-30 RX ADMIN — PIPERACILLIN SODIUM,TAZOBACTAM SODIUM 3.38 G: 3; .375 INJECTION, POWDER, FOR SOLUTION INTRAVENOUS at 05:28

## 2018-01-30 RX ADMIN — FOLIC ACID 1 MG: 1 TABLET ORAL at 12:48

## 2018-01-30 RX ADMIN — VANCOMYCIN HYDROCHLORIDE 2500 MG: 500 INJECTION, POWDER, LYOPHILIZED, FOR SOLUTION INTRAVENOUS at 03:27

## 2018-01-30 RX ADMIN — LACTULOSE 20 G: 20 SOLUTION ORAL at 12:52

## 2018-01-30 RX ADMIN — TAMSULOSIN HYDROCHLORIDE 0.4 MG: 0.4 CAPSULE ORAL at 21:19

## 2018-01-30 RX ADMIN — VANCOMYCIN HYDROCHLORIDE: 500 INJECTION, POWDER, LYOPHILIZED, FOR SOLUTION INTRAVENOUS at 03:28

## 2018-01-30 RX ADMIN — PIPERACILLIN SODIUM,TAZOBACTAM SODIUM 3.38 G: 3; .375 INJECTION, POWDER, FOR SOLUTION INTRAVENOUS at 21:20

## 2018-01-30 RX ADMIN — SPIRONOLACTONE 100 MG: 100 TABLET, FILM COATED ORAL at 12:48

## 2018-01-30 RX ADMIN — PRENATAL VIT W/ FE FUMARATE-FA TAB 27-0.8 MG 1 TABLET: 27-0.8 TAB at 12:47

## 2018-01-30 RX ADMIN — ZINC SULFATE CAP 220 MG (50 MG ELEMENTAL ZN) 220 MG: 220 (50 ZN) CAP at 21:19

## 2018-01-30 RX ADMIN — CHLORDIAZEPOXIDE HYDROCHLORIDE 25 MG: 25 CAPSULE ORAL at 12:46

## 2018-01-30 RX ADMIN — RIFAXIMIN 600 MG: 200 TABLET ORAL at 12:47

## 2018-01-30 RX ADMIN — PIPERACILLIN SODIUM,TAZOBACTAM SODIUM 3.38 G: 3; .375 INJECTION, POWDER, FOR SOLUTION INTRAVENOUS at 15:07

## 2018-01-30 RX ADMIN — TRAZODONE HYDROCHLORIDE 50 MG: 50 TABLET ORAL at 21:19

## 2018-01-30 RX ADMIN — ASCORBIC ACID, THIAMINE MONONITRATE,RIBOFLAVIN, NIACINAMIDE, PYRIDOXINE HYDROCHLORIDE, FOLIC ACID, CYANOCOBALAMIN, BIOTIN, CALCIUM PANTOTHENATE, 1 MG: 100; 1.5; 1.7; 20; 10; 1; 6000; 150000; 5 CAPSULE, LIQUID FILLED ORAL at 12:48

## 2018-01-30 RX ADMIN — VANCOMYCIN HYDROCHLORIDE 2500 MG: 1 INJECTION, POWDER, LYOPHILIZED, FOR SOLUTION INTRAVENOUS at 03:29

## 2018-01-30 RX ADMIN — FUROSEMIDE 40 MG: 10 INJECTION, SOLUTION INTRAMUSCULAR; INTRAVENOUS at 21:21

## 2018-01-30 RX ADMIN — Medication 100 MG: at 12:47

## 2018-01-30 RX ADMIN — GABAPENTIN 600 MG: 300 CAPSULE ORAL at 21:19

## 2018-01-31 ENCOUNTER — INPATIENT HOSPITAL (OUTPATIENT)
Dept: URBAN - METROPOLITAN AREA HOSPITAL 27 | Facility: HOSPITAL | Age: 53
End: 2018-01-31
Payer: COMMERCIAL

## 2018-01-31 DIAGNOSIS — K70.11 ALCOHOLIC HEPATITIS WITH ASCITES: ICD-10-CM

## 2018-01-31 DIAGNOSIS — E87.1 HYPO-OSMOLALITY AND HYPONATREMIA: ICD-10-CM

## 2018-01-31 LAB
POTASSIUM UR-SCNC: 22.1 MMOL/L
SODIUM UR-SCNC: 34 MMOL/L
VANCOMYCIN SERPL-MCNC: 27.9 MCG/ML (ref 5–40)

## 2018-01-31 PROCEDURE — 80202 ASSAY OF VANCOMYCIN: CPT | Performed by: HOSPITALIST

## 2018-01-31 PROCEDURE — 82105 ALPHA-FETOPROTEIN SERUM: CPT | Performed by: INTERNAL MEDICINE

## 2018-01-31 PROCEDURE — 84133 ASSAY OF URINE POTASSIUM: CPT | Performed by: INTERNAL MEDICINE

## 2018-01-31 PROCEDURE — 97165 OT EVAL LOW COMPLEX 30 MIN: CPT

## 2018-01-31 PROCEDURE — 25010000002 PIPERACILLIN-TAZOBACTAM: Performed by: HOSPITALIST

## 2018-01-31 PROCEDURE — 92523 SPEECH SOUND LANG COMPREHEN: CPT

## 2018-01-31 PROCEDURE — 99231 SBSQ HOSP IP/OBS SF/LOW 25: CPT

## 2018-01-31 PROCEDURE — 25010000002 VANCOMYCIN 1500 MG/250ML SOLUTION: Performed by: HOSPITALIST

## 2018-01-31 PROCEDURE — 97161 PT EVAL LOW COMPLEX 20 MIN: CPT

## 2018-01-31 PROCEDURE — 99232 SBSQ HOSP IP/OBS MODERATE 35: CPT | Performed by: NURSE PRACTITIONER

## 2018-01-31 PROCEDURE — 25010000002 FUROSEMIDE PER 20 MG: Performed by: INTERNAL MEDICINE

## 2018-01-31 PROCEDURE — 84300 ASSAY OF URINE SODIUM: CPT | Performed by: INTERNAL MEDICINE

## 2018-01-31 RX ORDER — LACTULOSE 20 G/30ML
20 SOLUTION ORAL
Status: DISCONTINUED | OUTPATIENT
Start: 2018-01-31 | End: 2018-02-02 | Stop reason: HOSPADM

## 2018-01-31 RX ADMIN — VANCOMYCIN HCL-SODIUM CHLORIDE IV SOLN 1.5 GM/250ML-0.9% 1500 MG: 1.5-0.9/25 SOLUTION at 01:45

## 2018-01-31 RX ADMIN — LACTULOSE 20 G: 20 SOLUTION ORAL at 20:50

## 2018-01-31 RX ADMIN — RIFAXIMIN 600 MG: 200 TABLET ORAL at 08:31

## 2018-01-31 RX ADMIN — PIPERACILLIN SODIUM,TAZOBACTAM SODIUM 3.38 G: 3; .375 INJECTION, POWDER, FOR SOLUTION INTRAVENOUS at 11:52

## 2018-01-31 RX ADMIN — PRENATAL VIT W/ FE FUMARATE-FA TAB 27-0.8 MG 1 TABLET: 27-0.8 TAB at 08:34

## 2018-01-31 RX ADMIN — FUROSEMIDE 40 MG: 10 INJECTION, SOLUTION INTRAMUSCULAR; INTRAVENOUS at 08:34

## 2018-01-31 RX ADMIN — Medication 100 MG: at 08:33

## 2018-01-31 RX ADMIN — TAMSULOSIN HYDROCHLORIDE 0.4 MG: 0.4 CAPSULE ORAL at 20:50

## 2018-01-31 RX ADMIN — FOLIC ACID 1 MG: 1 TABLET ORAL at 08:32

## 2018-01-31 RX ADMIN — GABAPENTIN 600 MG: 300 CAPSULE ORAL at 20:50

## 2018-01-31 RX ADMIN — SPIRONOLACTONE 200 MG: 100 TABLET, FILM COATED ORAL at 08:30

## 2018-01-31 RX ADMIN — ASCORBIC ACID, THIAMINE MONONITRATE,RIBOFLAVIN, NIACINAMIDE, PYRIDOXINE HYDROCHLORIDE, FOLIC ACID, CYANOCOBALAMIN, BIOTIN, CALCIUM PANTOTHENATE, 1 MG: 100; 1.5; 1.7; 20; 10; 1; 6000; 150000; 5 CAPSULE, LIQUID FILLED ORAL at 08:31

## 2018-01-31 RX ADMIN — VANCOMYCIN HCL-SODIUM CHLORIDE IV SOLN 1.5 GM/250ML-0.9% 1500 MG: 1.5-0.9/25 SOLUTION at 18:02

## 2018-01-31 RX ADMIN — LACTULOSE 20 G: 20 SOLUTION ORAL at 08:33

## 2018-01-31 RX ADMIN — CHLORDIAZEPOXIDE HYDROCHLORIDE 25 MG: 25 CAPSULE ORAL at 08:42

## 2018-01-31 RX ADMIN — Medication 10 ML: at 16:51

## 2018-01-31 RX ADMIN — Medication 10 ML: at 08:43

## 2018-01-31 RX ADMIN — FUROSEMIDE 40 MG: 10 INJECTION, SOLUTION INTRAMUSCULAR; INTRAVENOUS at 20:50

## 2018-01-31 RX ADMIN — Medication 10 ML: at 11:52

## 2018-01-31 RX ADMIN — ZINC SULFATE CAP 220 MG (50 MG ELEMENTAL ZN) 220 MG: 220 (50 ZN) CAP at 08:29

## 2018-01-31 RX ADMIN — PANTOPRAZOLE SODIUM 40 MG: 40 TABLET, DELAYED RELEASE ORAL at 08:32

## 2018-01-31 RX ADMIN — VANCOMYCIN HCL-SODIUM CHLORIDE IV SOLN 1.5 GM/250ML-0.9% 1500 MG: 1.5-0.9/25 SOLUTION at 08:29

## 2018-01-31 RX ADMIN — ZINC SULFATE CAP 220 MG (50 MG ELEMENTAL ZN) 220 MG: 220 (50 ZN) CAP at 20:51

## 2018-01-31 RX ADMIN — PIPERACILLIN SODIUM,TAZOBACTAM SODIUM 3.38 G: 3; .375 INJECTION, POWDER, FOR SOLUTION INTRAVENOUS at 04:45

## 2018-01-31 RX ADMIN — RIFAXIMIN 600 MG: 200 TABLET ORAL at 20:51

## 2018-01-31 RX ADMIN — PIPERACILLIN SODIUM,TAZOBACTAM SODIUM 3.38 G: 3; .375 INJECTION, POWDER, FOR SOLUTION INTRAVENOUS at 20:49

## 2018-01-31 RX ADMIN — TRAZODONE HYDROCHLORIDE 50 MG: 50 TABLET ORAL at 20:50

## 2018-02-01 ENCOUNTER — INPATIENT HOSPITAL (OUTPATIENT)
Dept: URBAN - METROPOLITAN AREA HOSPITAL 27 | Facility: HOSPITAL | Age: 53
End: 2018-02-01
Payer: COMMERCIAL

## 2018-02-01 DIAGNOSIS — K70.11 ALCOHOLIC HEPATITIS WITH ASCITES: ICD-10-CM

## 2018-02-01 DIAGNOSIS — E87.1 HYPO-OSMOLALITY AND HYPONATREMIA: ICD-10-CM

## 2018-02-01 LAB
AFP-TM SERPL-MCNC: 4.7 NG/ML (ref 0–8.3)
ALBUMIN SERPL-MCNC: 2 G/DL (ref 3.5–5.2)
ALBUMIN/GLOB SERPL: 0.5 G/DL
ALP SERPL-CCNC: 177 U/L (ref 40–129)
ALT SERPL W P-5'-P-CCNC: 32 U/L (ref 5–41)
AMMONIA BLD-SCNC: 91 UMOL/L (ref 16–60)
ANION GAP SERPL CALCULATED.3IONS-SCNC: 9.5 MMOL/L
AST SERPL-CCNC: 61 U/L (ref 5–40)
BACTERIA SPEC AEROBE CULT: NORMAL
BASOPHILS # BLD AUTO: 0.05 10*3/MM3 (ref 0–0.2)
BASOPHILS NFR BLD AUTO: 0.6 % (ref 0–2)
BILIRUB SERPL-MCNC: 3.9 MG/DL (ref 0.2–1.2)
BUN BLD-MCNC: 13 MG/DL (ref 6–20)
BUN/CREAT SERPL: 11.3 (ref 7–25)
CALCIUM SPEC-SCNC: 7.6 MG/DL (ref 8.6–10.5)
CHLORIDE SERPL-SCNC: 97 MMOL/L (ref 98–107)
CO2 SERPL-SCNC: 23.5 MMOL/L (ref 22–29)
CREAT BLD-MCNC: 1.15 MG/DL (ref 0.76–1.27)
DEPRECATED RDW RBC AUTO: 63.4 FL (ref 37–54)
EOSINOPHIL # BLD AUTO: 0.22 10*3/MM3 (ref 0.1–0.3)
EOSINOPHIL NFR BLD AUTO: 2.8 % (ref 0–4)
ERYTHROCYTE [DISTWIDTH] IN BLOOD BY AUTOMATED COUNT: 19 % (ref 11.5–14.5)
GFR SERPL CREATININE-BSD FRML MDRD: 67 ML/MIN/1.73
GLOBULIN UR ELPH-MCNC: 3.7 GM/DL
GLUCOSE BLD-MCNC: 112 MG/DL (ref 65–99)
GRAM STN SPEC: NORMAL
GRAM STN SPEC: NORMAL
HCT VFR BLD AUTO: 24.6 % (ref 42–52)
HGB BLD-MCNC: 8.8 G/DL (ref 14–18)
IMM GRANULOCYTES # BLD: 0.05 10*3/MM3 (ref 0–0.03)
IMM GRANULOCYTES NFR BLD: 0.6 % (ref 0–0.5)
INR PPP: 2.22 (ref 0.9–1.1)
LYMPHOCYTES # BLD AUTO: 2.56 10*3/MM3 (ref 0.6–4.8)
LYMPHOCYTES NFR BLD AUTO: 33.1 % (ref 20–45)
MCH RBC QN AUTO: 33 PG (ref 27–31)
MCHC RBC AUTO-ENTMCNC: 35.8 G/DL (ref 31–37)
MCV RBC AUTO: 92.1 FL (ref 80–94)
MONOCYTES # BLD AUTO: 1.07 10*3/MM3 (ref 0–1)
MONOCYTES NFR BLD AUTO: 13.8 % (ref 3–8)
NEUTROPHILS # BLD AUTO: 3.79 10*3/MM3 (ref 1.5–8.3)
NEUTROPHILS NFR BLD AUTO: 49.1 % (ref 45–70)
NRBC BLD MANUAL-RTO: 0.3 /100 WBC (ref 0–0)
PLAT MORPH BLD: NORMAL
PLATELET # BLD AUTO: 57 10*3/MM3 (ref 140–500)
PMV BLD AUTO: 10.3 FL (ref 7.4–10.4)
POIKILOCYTOSIS BLD QL SMEAR: NORMAL
POTASSIUM BLD-SCNC: 3.6 MMOL/L (ref 3.5–5.2)
PROT SERPL-MCNC: 5.7 G/DL (ref 6–8.5)
PROTHROMBIN TIME: 25 SECONDS (ref 12.1–15)
RBC # BLD AUTO: 2.67 10*6/MM3 (ref 4.7–6.1)
SODIUM BLD-SCNC: 130 MMOL/L (ref 136–145)
TARGETS BLD QL SMEAR: NORMAL
WBC MORPH BLD: NORMAL
WBC NRBC COR # BLD: 7.74 10*3/MM3 (ref 4.8–10.8)

## 2018-02-01 PROCEDURE — 25010000002 FUROSEMIDE PER 20 MG: Performed by: INTERNAL MEDICINE

## 2018-02-01 PROCEDURE — 25010000002 VANCOMYCIN PER 500 MG: Performed by: HOSPITALIST

## 2018-02-01 PROCEDURE — 85610 PROTHROMBIN TIME: CPT | Performed by: NURSE PRACTITIONER

## 2018-02-01 PROCEDURE — 99233 SBSQ HOSP IP/OBS HIGH 50: CPT | Performed by: NURSE PRACTITIONER

## 2018-02-01 PROCEDURE — 99231 SBSQ HOSP IP/OBS SF/LOW 25: CPT

## 2018-02-01 PROCEDURE — 97535 SELF CARE MNGMENT TRAINING: CPT

## 2018-02-01 PROCEDURE — 80053 COMPREHEN METABOLIC PANEL: CPT | Performed by: NURSE PRACTITIONER

## 2018-02-01 PROCEDURE — 25010000002 PIPERACILLIN-TAZOBACTAM: Performed by: HOSPITALIST

## 2018-02-01 PROCEDURE — 97110 THERAPEUTIC EXERCISES: CPT

## 2018-02-01 PROCEDURE — 87040 BLOOD CULTURE FOR BACTERIA: CPT | Performed by: NURSE PRACTITIONER

## 2018-02-01 PROCEDURE — 82140 ASSAY OF AMMONIA: CPT | Performed by: NURSE PRACTITIONER

## 2018-02-01 PROCEDURE — 85007 BL SMEAR W/DIFF WBC COUNT: CPT | Performed by: NURSE PRACTITIONER

## 2018-02-01 PROCEDURE — 85025 COMPLETE CBC W/AUTO DIFF WBC: CPT | Performed by: NURSE PRACTITIONER

## 2018-02-01 RX ADMIN — PANTOPRAZOLE SODIUM 40 MG: 40 TABLET, DELAYED RELEASE ORAL at 09:20

## 2018-02-01 RX ADMIN — TRAZODONE HYDROCHLORIDE 50 MG: 50 TABLET ORAL at 21:48

## 2018-02-01 RX ADMIN — VANCOMYCIN HYDROCHLORIDE 1250 MG: 1 INJECTION, POWDER, LYOPHILIZED, FOR SOLUTION INTRAVENOUS at 04:34

## 2018-02-01 RX ADMIN — Medication 100 MG: at 09:20

## 2018-02-01 RX ADMIN — LACTULOSE 20 G: 20 SOLUTION ORAL at 21:46

## 2018-02-01 RX ADMIN — RIFAXIMIN 600 MG: 200 TABLET ORAL at 21:48

## 2018-02-01 RX ADMIN — POTASSIUM CHLORIDE 40 MEQ: 1500 TABLET, EXTENDED RELEASE ORAL at 14:51

## 2018-02-01 RX ADMIN — GABAPENTIN 600 MG: 300 CAPSULE ORAL at 21:47

## 2018-02-01 RX ADMIN — PIPERACILLIN SODIUM,TAZOBACTAM SODIUM 3.38 G: 3; .375 INJECTION, POWDER, FOR SOLUTION INTRAVENOUS at 06:15

## 2018-02-01 RX ADMIN — FUROSEMIDE 40 MG: 10 INJECTION, SOLUTION INTRAMUSCULAR; INTRAVENOUS at 09:20

## 2018-02-01 RX ADMIN — FUROSEMIDE 40 MG: 10 INJECTION, SOLUTION INTRAMUSCULAR; INTRAVENOUS at 21:47

## 2018-02-01 RX ADMIN — ZINC SULFATE CAP 220 MG (50 MG ELEMENTAL ZN) 220 MG: 220 (50 ZN) CAP at 21:48

## 2018-02-01 RX ADMIN — FOLIC ACID 1 MG: 1 TABLET ORAL at 09:20

## 2018-02-01 RX ADMIN — PRENATAL VIT W/ FE FUMARATE-FA TAB 27-0.8 MG 1 TABLET: 27-0.8 TAB at 11:24

## 2018-02-01 RX ADMIN — SPIRONOLACTONE 200 MG: 100 TABLET, FILM COATED ORAL at 09:20

## 2018-02-01 RX ADMIN — LACTULOSE 20 G: 20 SOLUTION ORAL at 17:01

## 2018-02-01 RX ADMIN — ZINC SULFATE CAP 220 MG (50 MG ELEMENTAL ZN) 220 MG: 220 (50 ZN) CAP at 09:20

## 2018-02-01 RX ADMIN — ASCORBIC ACID, THIAMINE MONONITRATE,RIBOFLAVIN, NIACINAMIDE, PYRIDOXINE HYDROCHLORIDE, FOLIC ACID, CYANOCOBALAMIN, BIOTIN, CALCIUM PANTOTHENATE, 1 MG: 100; 1.5; 1.7; 20; 10; 1; 6000; 150000; 5 CAPSULE, LIQUID FILLED ORAL at 11:25

## 2018-02-01 RX ADMIN — POTASSIUM CHLORIDE 40 MEQ: 1500 TABLET, EXTENDED RELEASE ORAL at 19:20

## 2018-02-01 RX ADMIN — LACTULOSE 20 G: 20 SOLUTION ORAL at 12:30

## 2018-02-01 RX ADMIN — TAMSULOSIN HYDROCHLORIDE 0.4 MG: 0.4 CAPSULE ORAL at 21:48

## 2018-02-01 RX ADMIN — RIFAXIMIN 600 MG: 200 TABLET ORAL at 09:20

## 2018-02-01 RX ADMIN — LACTULOSE 20 G: 20 SOLUTION ORAL at 09:20

## 2018-02-01 NOTE — PLAN OF CARE
Problem: Patient Care Overview (Adult)  Goal: Plan of Care Review   02/01/18 1125   Coping/Psychosocial Response Interventions   Plan Of Care Reviewed With patient   Outcome Evaluation   Outcome Summary/Follow up Plan OT: pt very impulsive, decreased safety awareness. pt requires cues to maintain attention to task. pt cga- min assist for functional transfers with rolling walker and verbal cues for safety. pt mod assist to don pants and underwear from supine position in bed. therapist requested pt attempt dressing from EOB however pt refused. rec 24 hr supervsion due to cognitive delays and decreased safety awareness

## 2018-02-01 NOTE — NURSING NOTE
Continued Stay Note  GINA Clement     Patient Name: Wiley Winston  MRN: [id number]  Today's Date: 2/1/2018    Admit Date: 1/27/2018          Discharge Plan       02/01/18 1512    Case Management/Social Work Plan    Additional Comments extended conversation approx one hour with patient, significant other and Dorina VEGA regarding discharge planning. patient agreeable to go to [facility]. he will be transported by ambulance. he was agreeable to explanation of services from [facility]. spoke with Paola from [facility] and they will be calling significant other via telephone. information to be faxed to [phone]. will continue to follow.       02/01/18 1239    Case Management/Social Work Plan    Plan accepted to [facility]     Additional Comments spoke with Elizabeth @ [facility] and patient has been accepted to facility. spoke with significant other Courtney and she is on her way to the hospital, awaiting her arrival. updated Dorina VEGA, anticipated dc 2/2/18. will continue to follow.              Discharge Codes     None            Zo Terrell RN

## 2018-02-01 NOTE — PROGRESS NOTES
"SERVICE: [facility] HOSPITALIST    CONSULTANTS: ortho/GI/surgery/nutrition    CHIEF COMPLAINT: follow-up ascites, diarrhea, septic bursitis left elbow    SUBJECTIVE: the patient reports he is feeling better today. Discussion held with his significant other at the bedside, both are agreeable to talk to [facility] and to rehabilitation placement tomorrow. Patient tearful during discussion. The patient notes 2-3 bowel movements today.  Requesting increase in fluid intake and dietary restrictions lifted. No other note of f/c/cough/soa/chest pain/n/v/d/abdominal pain or other new concerns.    OBJECTIVE:    /80 (BP Location: Left arm, Patient Position: Lying)  Pulse 101  Temp 97.5 °F (36.4 °C) (Oral)   Resp 18  Ht 188 cm (74\")  Wt (!) 138 kg (303 lb 6.4 oz)  SpO2 99%  BMI 38.95 kg/m2    MEDS/LABS REVIEWED AND ORDERED    b complex-C-folic acid 1 capsule Oral Daily   folic acid 1 mg Oral Daily   furosemide 40 mg Intravenous Q12H   gabapentin 600 mg Oral Nightly   lactulose 20 g Oral 4x Daily - RT   pantoprazole 40 mg Oral Daily   prenatal vitamin 27-0.8 1 tablet Oral Daily   rifaximin 600 mg Oral Q12H   spironolactone 200 mg Oral Daily   tamsulosin 0.4 mg Oral Nightly   thiamine 100 mg Oral Daily   traZODone 50 mg Oral Nightly   zinc sulfate 220 mg Oral BID     Physical Exam   Constitutional: He is oriented to person, place, and time. He appears well-developed.   Jaundiced, obese   HENT:   Head: Normocephalic and atraumatic.   Eyes: EOM are normal. Pupils are equal, round, and reactive to light.   Cardiovascular: Normal rate and regular rhythm.    Pulmonary/Chest: Effort normal and breath sounds normal.   Abdominal: Bowel sounds are normal. He exhibits distension. There is no tenderness.   Musculoskeletal:   3+ pitting edema bilateral lower extremities   Neurological: He is alert and oriented to person, place, and time.   Skin: Skin is warm and dry. No erythema.   Psychiatric:   tearful "     LAB/DIAGNOSTICS:    Lab Results (last 24 hours)     Procedure Component Value Units Date/Time    Vancomycin, Random [[id number]]  (Normal) Collected:  01/31/18 1652    Specimen:  Blood Updated:  01/31/18 1739     Vancomycin Random 27.90 mcg/mL     Protime-INR [[id number]]  (Abnormal) Collected:  02/01/18 0354    Specimen:  Blood Updated:  02/01/18 0422     Protime 25.0 (H) Seconds      INR 2.22 (H)    Narrative:       Therapeutic Ranges for INR: 2.0-3.0 (PT 20-30)                              2.5-3.5 (PT 25-34)    Comprehensive Metabolic Panel [[id number]]  (Abnormal) Collected:  02/01/18 0354    Specimen:  Blood Updated:  02/01/18 0438     Glucose 112 (H) mg/dL      BUN 13 mg/dL      Creatinine 1.15 mg/dL      Sodium 130 (L) mmol/L      Potassium 3.6 mmol/L      Chloride 97 (L) mmol/L      CO2 23.5 mmol/L      Calcium 7.6 (L) mg/dL      Total Protein 5.7 (L) g/dL      Albumin 2.00 (L) g/dL      ALT (SGPT) 32 U/L      AST (SGOT) 61 (H) U/L      Alkaline Phosphatase 177 (H) U/L      Total Bilirubin 3.9 (H) mg/dL      eGFR Non African Amer 67 mL/min/1.73      Globulin 3.7 gm/dL      A/G Ratio 0.5 g/dL      BUN/Creatinine Ratio 11.3     Anion Gap 9.5 mmol/L     Ammonia [[id number]]  (Abnormal) Collected:  02/01/18 0354    Specimen:  Blood Updated:  02/01/18 0439     Ammonia 91 (H) umol/L     CBC Auto Differential [[id number]]  (Abnormal) Collected:  02/01/18 0354    Specimen:  Blood Updated:  02/01/18 0556     WBC 7.74 10*3/mm3      RBC 2.67 (L) 10*6/mm3      Hemoglobin 8.8 (L) g/dL      Hematocrit 24.6 (L) %      MCV 92.1 fL      MCH 33.0 (H) pg      MCHC 35.8 g/dL      RDW 19.0 (H) %      RDW-SD 63.4 (H) fl      MPV 10.3 fL      Platelets 57 (L) 10*3/mm3      Neutrophil % 49.1 %      Lymphocyte % 33.1 %      Monocyte % 13.8 (H) %      Eosinophil % 2.8 %      Basophil % 0.6 %      Immature Grans % 0.6 (H) %      Neutrophils, Absolute 3.79 10*3/mm3      Lymphocytes, Absolute 2.56 10*3/mm3      Monocytes, Absolute 1.07  (H) 10*3/mm3      Eosinophils, Absolute 0.22 10*3/mm3      Basophils, Absolute 0.05 10*3/mm3      Immature Grans, Absolute 0.05 (H) 10*3/mm3      nRBC 0.3 (H) /100 WBC     CBC & Differential [[id number]] Collected:  02/01/18 0354    Specimen:  Blood Updated:  02/01/18 0627    Narrative:       The following orders were created for panel order CBC & Differential.  Procedure                               Abnormality         Status                     ---------                               -----------         ------                     Scan Slide[[id number]]                                       Final result               CBC Auto Differential[[id number]]        Abnormal            Final result                 Please view results for these tests on the individual orders.    Scan Slide [[id number]] Collected:  02/01/18 0354    Specimen:  Blood Updated:  02/01/18 0627     Poikilocytes Slight/1+     Target Cells Slight/1+     WBC Morphology Normal     Platelet Morphology Normal    Wound Culture - Surgical Site, Arm, Left [[id number]]  (Normal) Collected:  01/29/18 0104    Specimen:  Surgical Site from Arm, Left Updated:  02/01/18 0727     Wound Culture No growth at 3 days     Gram Stain Result Few (2+) WBCs seen      No organisms seen    AFP Tumor Marker [[id number]] Collected:  01/31/18 0605    Specimen:  Blood Updated:  02/01/18 0822     AFP Tumor Marker 4.7 ng/mL       Roche ECLIA methodology       Narrative:       Performed at:  [address] [place] [address]  [id number]  : Jayden Alba PhD, Phone:  [phone]    Blood Culture - Blood, [[id number]] Collected:  02/01/18 0840    Specimen:  Blood from Blood, Central Line Updated:  02/01/18 0840    Blood Culture - Blood, [[id number]] Collected:  02/01/18 0930    Specimen:  Blood from Hand, Left Updated:  02/01/18 0937        ASSESSMENT/PLAN:  1. Septic Bursitis of left elbow: ortho following  Wound culture no growth at 3 days  BC x 2  pending  D/W Dr. Sellers, antibiotics discontinued today with continued wound care and dressing changes  Monitor closely and consult wound RN     2. Alcoholic Cirrhosis/Hepatitis  3. Ascites/hyponatremia: GI following  Continues on Xifaxan, Lasix, spironolactone, Librium  Lactulose increased to qid with improved mentation  Sodium 130, stable, continue fluid restriction  Continue cardiac consistent carbohydrate low sodium diet  INR remains high at 2.22  MELD score 26=19.6% 3 month mortality  D/W patient and significant other (girlfriend of 10 years), as well as Dr. Caceres  Plan is to consult [facility] to discuss possible end of life care options  Transfer to SNF tomorrow for continued rehabilitation  Obtain outpatient appointment with [facility] Liver Transplant team to evaluate option for transplant    4. Hepatic encephalopathy/moderate cognitive deficiencies:   SLP following  Ammonia 91, trend on increased lactulose  As above     5. Diarrhea: improved, C. Difficile negative, on lactulose with loose stools     6. Electrolyte imbalance:   Continues on electrolyte replacement protocols for potassium, phosphorus and magnesium     7. Chronic anemia, thrombocytopenia, coagulopathy:  Continues on folic acid/thiamine  Hemoglobin stable at 8.8      8. Hypertension: at goal on medications as above     9. BPH: no acute issues on Flomax     10. GERD: no acute issues on Protonix     11. Tobacco abuse: no acute issues     12. Chronic back pain: SNF placement recommended at discharge  PT/OT ongoing  Continued on Neurontin 600 mg nightly    Plan: as per number 3 above, SNF tomorrow with [facility] consult here or at SNF  30 Minutes face to face time spent in consult with patient/girlfriend with RAMON Pathak case manager at bedside as well

## 2018-02-01 NOTE — PLAN OF CARE
Problem: Patient Care Overview (Adult)  Goal: Plan of Care Review  Outcome: Ongoing (interventions implemented as appropriate)   01/30/18 1641 02/01/18 0701   Coping/Psychosocial Response Interventions   Plan Of Care Reviewed With --  patient   Patient Care Overview   Progress progress toward functional goals as expected --      Goal: Adult Individualization and Mutuality  Outcome: Ongoing (interventions implemented as appropriate)    Goal: Discharge Needs Assessment  Outcome: Ongoing (interventions implemented as appropriate)      Problem: Infection, Risk/Actual (Adult)  Goal: Identify Related Risk Factors and Signs and Symptoms  Outcome: Ongoing (interventions implemented as appropriate)    Goal: Infection Prevention/Resolution  Outcome: Ongoing (interventions implemented as appropriate)      Problem: Fall Risk (Adult)  Goal: Identify Related Risk Factors and Signs and Symptoms  Outcome: Ongoing (interventions implemented as appropriate)    Goal: Absence of Falls  Outcome: Ongoing (interventions implemented as appropriate)      Problem: Fluid Volume Deficit (Adult)  Goal: Identify Related Risk Factors and Signs and Symptoms  Outcome: Ongoing (interventions implemented as appropriate)    Goal: Fluid/Electrolyte Balance  Outcome: Ongoing (interventions implemented as appropriate)    Goal: Comfort/Well Being  Outcome: Ongoing (interventions implemented as appropriate)      Problem: Skin Integrity Impairment, Risk/Actual (Adult)  Goal: Identify Related Risk Factors and Signs and Symptoms  Outcome: Ongoing (interventions implemented as appropriate)    Goal: Skin Integrity/Wound Healing  Outcome: Ongoing (interventions implemented as appropriate)      Problem: Pressure Ulcer Risk (Sammy Scale) (Adult,Obstetrics,Pediatric)  Goal: Identify Related Risk Factors and Signs and Symptoms  Outcome: Ongoing (interventions implemented as appropriate)    Goal: Skin Integrity  Outcome: Ongoing (interventions implemented as  appropriate)

## 2018-02-01 NOTE — THERAPY TREATMENT NOTE
Acute Care - Occupational Therapy Treatment Note   Sarah Hardin     Patient Name: Wiley Winston  : 1965  MRN: [id number]  Today's Date: 2018  Onset of Illness/Injury or Date of Surgery Date: 18  Date of Referral to OT: 18  Referring Physician: Nelida      Admit Date: 2018    Visit Dx:     ICD-10-CM ICD-9-CM   1. Diarrhea, unspecified type R19.7 787.91   2. Hypokalemia E87.6 276.8   3. Hyponatremia E87.1 276.1   4. Cirrhosis of liver with ascites, unspecified hepatic cirrhosis type K74.60 571.5   5. Elbow wound, left, sequela S51.002S 906.1     Patient Active Problem List   Diagnosis   • Diarrhea             Adult Rehabilitation Note       18 0848 18 0847       Rehab Assessment/Intervention    Discipline occupational therapist  -JJ physical therapist  -     Document Type therapy note (daily note)  - therapy note (daily note)  -     Subjective Information agree to therapy  - agree to therapy  -     Patient Effort, Rehab Treatment adequate  - adequate  -     Symptoms Noted During/After Treatment  none  -     Symptoms Noted Comment pt impulsive, confusion. pt put PICC line in mouth, cued pt to not place in mouth and notified RN  -      Recorded by [] Trinity Garza, OTR [JW] Isabelle Angeles, PT     Pain Assessment    Pain Assessment No/denies pain  - No/denies pain  -     Recorded by [] Trinity Garza, OTR [JW] Isabelle Angeles, PT     Cognitive Assessment/Intervention    Personal Safety impulsive;decreased insight to deficits  -      Recorded by [J] Trinity Garza, OTR      Bed Mobility, Assessment/Treatment    Bed Mobility, Assistive Device bed rails;head of bed elevated  - bed rails;head of bed elevated  -     Bed Mob, Supine to Sit, [place] supervision required  - supervision required   increased time required  -     Bed Mobility, Comment pt required cues for sequencing and extended time. pt distractible,  required cues to remain focused on task  -JJ pt requires cues for use of UEs to assist with supine to sit transfer.  pt requires significantly increased time to complete  -JW     Recorded by [CHRISTINA] Trinity Garza, OTR [JW] Isabelle Angeles, PT     Transfer Assessment/Treatment    Transfers, Sit-Stand [place] contact guard assist;verbal cues required  -JJ contact guard assist;verbal cues required  -JW     Transfers, Sit-Stand-Sit, Assist Device rolling walker  -JJ rolling walker  -JW     Toilet Transfer, [place] minimum assist (75% patient effort);verbal cues required  -JJ minimum assist (75% patient effort);1 person + 1 person to manage equipment;verbal cues required  -JW     Toilet Transfer, Assistive Device rolling walker  -JJ rolling walker  -JW     Transfer, Comment required cues for hand placement and safety. pt very impulsive and requires cues to maintain safe distance from walker   -JJ pt requires verbal cues for proper hand placement and sequencing with transfer.    -JW     Recorded by [JJ] Trinity Garza, OTR [JW] Isabelle Angeles, SAMANTHA     Gait Assessment/Treatment    Gait, [place] Level  minimum assist (75% patient effort);verbal cues required;1 person + 1 person to manage equipment  -     Gait, Assistive Device  rolling walker  -     Gait, Distance (Feet)  30   declines further distance due to need for bowel movement  -     Gait, Gait Pattern Analysis  swing-through gait  -     Gait, Gait Deviations  [place] decreased;forward flexed posture;step length decreased  -     Gait, Safety Issues  step length decreased;sequencing ability decreased;balance decreased during turns  -     Gait, Comment  pt easily distracted during functional mobility, requires max verbal cues for attention to task and safety with walker.  pt requires min assist to navigate device around external obstacles/turns.  pt with wide base of support during mobility  -JW     Recorded by  [JW] Isabelle Angeles,  PT     Functional Mobility    Functional Mobility- [place]. Level contact guard assist;verbal cues required  -      Functional Mobility- Device rolling walker  -      Functional Mobility- Comment pt requires cues for safety and to stay close to walker, pt impulsive, poor safety awareness  -      Recorded by [J] Trinity Garza, JACKELINR      Lower Body Dressing Assessment/Training    LB Dressing Assess/Train, Comment pt required mod assist and setup to millie pants and underwear from supine in bed. pt able to bridge and pull pants over hips with CGA. recommended pt attempt dressing from sitting EOB, pt refused  -      Recorded by [JJ] Trinity Garza OTR      Positioning and Restraints    Pre-Treatment Position in bed  - in bed  -     Post Treatment Position bathroom  - bathroom  -JW     Bathroom sitting;with nsg  -J sitting;with nsg   RN in bathroom to assist patient  -JW     Recorded by [] Trinity Garza, JACKELINR [JW] Isabelle Angeles, PT       User Key  (r) = Recorded By, (t) = Taken By, (c) = Cosigned By    Initials Name Effective Dates     Trinity Garza, OTR 06/22/16 -     DERRELL Angeles, PT 12/01/15 -                 OT Goals       01/31/18 1321          Transfer Training OT STG    Transfer Training OT STG, Date Established 01/31/18  -      Transfer Training OT STG, Time to Achieve 3 days  -      Transfer Training OT STG, Activity Type toilet  -      Transfer Training OT STG, [place] Level supervision required  -      Transfer Training OT STG, Assist Device commode, bedside;walker, rolling  -      Dynamic Standing Balance OT STG    Dynamic Standing Balance OT STG, Date Established 01/31/18  -      Dynamic Standing Balance OT STG, Time to Achieve 3 days  -      Dynamic Standing Balance OT STG, [place] Level supervision required  -      Dynamic Standing Balance OT STG, Assist Device assistive Device  -      Dynamic Standing Balance OT STG, Additional  Goal x 5 minutes to increase I with adls  -JJ      LB Dressing OT STG    LB Dressing Goal OT STG, Date Established 01/31/18  -JJ      LB Dressing Goal OT STG, Time to Achieve 3 days  -JJ      LB Dressing Goal OT STG, [place] Level contact guard assist  -JJ      LB Dressing Goal OT STG, Adaptive Equipment --   with long handled ae if needed  -JJ        User Key  (r) = Recorded By, (t) = Taken By, (c) = Cosigned By    Initials Name Provider Type    CHRISTINA Garza OTR Occupational Therapist          Occupational Therapy Education     Title: PT OT SLP Therapies (Active)     Topic: Occupational Therapy (Done)     Point: ADL training (Done)    Description: Instruct learner(s) on proper safety adaptation and remediation techniques during self care or transfers.   Instruct in proper use of assistive devices.    Learning Progress Summary    Learner Readiness Method Response Comment Documented by Status   Patient Acceptance E VU,NR pt educated on adls, safety with functional transfers and mobility. pt impulsive, decreased safety awareness  02/01/18 1121 Done    Acceptance E VU,NR pt educated on benefits of activity, adls, and safety with functional transfers and mobility  01/31/18 1318 Done               Point: Precautions (Done)    Description: Instruct learner(s) on prescribed precautions during self-care and functional transfers.    Learning Progress Summary    Learner Readiness Method Response Comment Documented by Status   Patient Acceptance E VU,NR pt educated on benefits of activity, adls, and safety with functional transfers and mobility  01/31/18 1318 Done               Point: Body mechanics (Done)    Description: Instruct learner(s) on proper positioning and spine alignment during self-care, functional mobility activities and/or exercises.    Learning Progress Summary    Learner Readiness Method Response Comment Documented by Status   Patient Acceptance E VU,NR pt educated on adls, safety with  functional transfers and mobility. pt impulsive, decreased safety awareness  02/01/18 1121 Done    Acceptance E VU,NR pt educated on benefits of activity, adls, and safety with functional transfers and mobility  01/31/18 1318 Done                      User Key     Initials Effective Dates Name Provider Type Discipline     06/22/16 -  Trinity Garza, OTR Occupational Therapist OT                  OT Recommendation and Plan  Anticipated Equipment Needs At Discharge:  (pt may benefit from long handled ae)  Anticipated Discharge Disposition: extended care facility  Planned Therapy Interventions: ADL retraining, adaptive equipment training, transfer training, balance training  Therapy Frequency:  (x 2-3 additional visits)  Plan of Care Review  Plan Of Care Reviewed With: patient  Outcome Summary/Follow up Plan: OT: pt very impulsive, decreased safety awareness. pt requires cues to maintain attention to task. pt cga- min assist for functional  transfers with rolling walker and verbal cues for safety. pt mod assist to don pants and underwear from supine position in bed. therapist requested pt attempt dressing from EOB however pt refused. rec 24 hr supervsion due to cognitive delays and decreased safety awareness        Outcome Measures       02/01/18 0848 02/01/18 0847 01/31/18 0931    How much help from another person do you currently need...    Turning from your back to your side while in flat bed without using bedrails?  3  -JW     Moving from lying on back to sitting on the side of a flat bed without bedrails?  3  -JW     Moving to and from a bed to a chair (including a wheelchair)?  3  -JW     Standing up from a chair using your arms (e.g., wheelchair, bedside chair)?  3  -JW     Climbing 3-5 steps with a railing?  2  -JW     To walk in hospital room?  2  -JW     AM-PAC 6 Clicks Score  16  -JW     How much help from another is currently needed...    Putting on and taking off regular lower body clothing? 2   -JJ  2  -JJ    Bathing (including washing, rinsing, and drying) 2  -JJ  2  -JJ    Toileting (which includes using toilet bed pan or urinal) 2  -JJ  2  -JJ    Putting on and taking off regular upper body clothing 3  -JJ  3  -JJ    Taking care of personal grooming (such as brushing teeth) 4  -JJ  4  -JJ    Eating meals 4  -JJ  4  -JJ    Score 17  -JJ  17  -JJ    Functional Assessment    Outcome Measure Options  AM-PAC 6 Clicks Basic Mobility (PT)  - AM-PAC 6 Clicks Daily Activity (OT)  -J      01/31/18 0930          How much help from another person do you currently need...    Turning from your back to your side while in flat bed without using bedrails? 3  -BP      Moving from lying on back to sitting on the side of a flat bed without bedrails? 3  -BP      Moving to and from a bed to a chair (including a wheelchair)? 3  -BP      Standing up from a chair using your arms (e.g., wheelchair, bedside chair)? 3  -BP      Climbing 3-5 steps with a railing? 2  -BP      To walk in hospital room? 2  -BP      AM-PAC 6 Clicks Score 16  -BP      Functional Assessment    Outcome Measure Options AM-PAC 6 Clicks Basic Mobility (PT)  -BP        User Key  (r) = Recorded By, (t) = Taken By, (c) = Cosigned By    Initials Name Provider Type    SHANDRA Garza, OTR Occupational Therapist    BP Aviva Burgos, PT Physical Therapist    DERRELL Angeles, PT Physical Therapist           Time Calculation:         Time Calculation- OT       02/01/18 1130          Time Calculation- OT    OT Start Time 0847  -J        User Key  (r) = Recorded By, (t) = Taken By, (c) = Cosigned By    Initials Name Provider Type    SHANDRA Garza OTR Occupational Therapist           Therapy Charges for Today     Code Description Service Date Service Provider Modifiers Qty    35892379331  OT EVAL LOW COMPLEXITY 3 1/31/2018 FAZAL Collado GO 1    [id number]  OT SELF CARE/MGMT/TRAIN EA 15 MIN 2/1/2018 Trinity Garza  OTR GO 2               Trinity Garza, OTR  2/1/2018

## 2018-02-01 NOTE — PLAN OF CARE
Problem: Patient Care Overview (Adult)  Goal: Plan of Care Review  Outcome: Ongoing (interventions implemented as appropriate)   02/01/18 1006   Coping/Psychosocial Response Interventions   Plan Of Care Reviewed With patient   Outcome Evaluation   Outcome Summary/Follow up Plan PT: Patient requires significantly increased time to complete all tasks with frequent cues to remain on task. Patient performs gait x30 feet with rolling walker, min assist with additional person to assist with equipment. Patient continues to be impulsive throughout treatment session with repeated cues for proper use of walker. Patient with overall poor ability to follow commands throughout session. Recommend 24 hour care at discharge due to cognitive delays and poor insight into deficits.

## 2018-02-01 NOTE — PROGRESS NOTES
GI Daily Progress Note    Assessment/Plan:    Active Problems:    Diarrhea       LOS: 5 days     Wiley Winston is a 53 y.o. male who was admitted with Alcoholic hepatitis. He reports his symptoms are improving with treatment. Responsive but not fully oriented Good discussion with Girl friend will pursue placement. May need another dose of Lasix but would not increase his BID dosing for now.    Subjective:    Patient expresses No GI complaints  Patient denies abdominal pain and vomiting    Objective:    Vital signs in last 24 hours:  Temp:  [97.5 °F (36.4 °C)-98.2 °F (36.8 °C)] 97.5 °F (36.4 °C)  Heart Rate:  [] 101  Resp:  [18] 18  BP: (131-149)/(66-92) 138/80    Intake/Output last 3 shifts:  I/O last 3 completed shifts:  In: 2880 [P.O.:2280; IV Piggyback:600]  Out: 2051 [Urine:2050; Stool:1]  Intake/Output this shift:  I/O this shift:  In: 240 [P.O.:240]  Out: 600 [Urine:400; Stool:200]    Results from last 7 days  Lab Units 02/01/18  0354   WBC 10*3/mm3 7.74   HEMOGLOBIN g/dL 8.8*   HEMATOCRIT % 24.6*   PLATELETS 10*3/mm3 57*       Results from last 7 days  Lab Units 02/01/18  0354   SODIUM mmol/L 130*   POTASSIUM mmol/L 3.6   CHLORIDE mmol/L 97*   CO2 mmol/L 23.5   BUN mg/dL 13   CREATININE mg/dL 1.15   GLUCOSE mg/dL 112*   CALCIUM mg/dL 7.6*       Physical Exam:Abdomen  Sounds Normal Active Bowel Sounds   Distension Soft, Distended and Ascites   Tenderness Nontender     LUE Septic Bursitis of Elbow  Alcoholic Hepatitis  Ascites  Hyponatremia  PSE    Will order one time dose of additional lasix and follow daily weights

## 2018-02-01 NOTE — THERAPY TREATMENT NOTE
Acute Care - Physical Therapy Treatment Note   Sarah Hardin     Patient Name: Wiley Winston  : 1965  MRN: [id number]  Today's Date: 2018  Onset of Illness/Injury or Date of Surgery Date: 18  Date of Referral to PT: 18  Referring Physician: Nelida    Admit Date: 2018    Visit Dx:    ICD-10-CM ICD-9-CM   1. Diarrhea, unspecified type R19.7 787.91   2. Hypokalemia E87.6 276.8   3. Hyponatremia E87.1 276.1   4. Cirrhosis of liver with ascites, unspecified hepatic cirrhosis type K74.60 571.5   5. Elbow wound, left, sequela S51.002S 906.1     Patient Active Problem List   Diagnosis   • Diarrhea               Adult Rehabilitation Note       18 0847          Rehab Assessment/Intervention    Discipline physical therapist  -JW      Document Type therapy note (daily note)  -JW      Subjective Information agree to therapy  -JW      Patient Effort, Rehab Treatment adequate  -JW      Symptoms Noted During/After Treatment none  -JW      Recorded by [JW] Isabelle Angeles, PT      Pain Assessment    Pain Assessment No/denies pain  -JW      Recorded by [JW] Isabelle Angeles, PT      Bed Mobility, Assessment/Treatment    Bed Mobility, Assistive Device bed rails;head of bed elevated  -JW      Bed Mob, Supine to Sit, [place] supervision required   increased time required  -JW      Bed Mobility, Comment pt requires cues for use of UEs to assist with supine to sit transfer.  pt requires significantly increased time to complete  -JW      Recorded by [JW] Isabelle Angeles PT      Transfer Assessment/Treatment    Transfers, Sit-Stand [place] contact guard assist;verbal cues required  -JW      Transfers, Sit-Stand-Sit, Assist Device rolling walker  -JW      Toilet Transfer, [place] minimum assist (75% patient effort);1 person + 1 person to manage equipment;verbal cues required  -JW      Toilet Transfer, Assistive Device rolling walker  -JW      Transfer, Comment pt requires verbal cues for  proper hand placement and sequencing with transfer.    -      Recorded by [JW] Isabelle Angeles, PT      Gait Assessment/Treatment    Gait, [place] Level minimum assist (75% patient effort);verbal cues required;1 person + 1 person to manage equipment  -      Gait, Assistive Device rolling walker  -      Gait, Distance (Feet) 30   declines further distance due to need for bowel movement  -      Gait, Gait Pattern Analysis swing-through gait  -      Gait, Gait Deviations [place] decreased;forward flexed posture;step length decreased  -      Gait, Safety Issues step length decreased;sequencing ability decreased;balance decreased during turns  -      Gait, Comment pt easily distracted during functional mobility, requires max verbal cues for attention to task and safety with walker.  pt requires min assist to navigate device around external obstacles/turns.  pt with wide base of support during mobility  -JW      Recorded by [DERRELL] Isabelle Angeles PT      Positioning and Restraints    Pre-Treatment Position in bed  -JW      Post Treatment Position bathroom  -JW      Bathroom sitting;with nsg   RN in bathroom to assist patient  -JW      Recorded by [JW] Isabelle Angeles PT        User Key  (r) = Recorded By, (t) = Taken By, (c) = Cosigned By    Initials Name Effective Dates    DERRELL Angeles, PT 12/01/15 -                 IP PT Goals       01/31/18 1100          Bed Mobility PT STG    Bed Mobility PT STG, Date Established 01/31/18  -BP      Bed Mobility PT STG, Time to Achieve 5 days  -BP      Bed Mobility PT STG, Activity Type supine to sit/sit to supine  -BP      Bed Mobility PT STG, [place] Level conditional independence  -BP      Transfer Training PT STG    Transfer Training PT STG, Date Established 01/31/18  -BP      Transfer Training PT STG, Time to Achieve 5 days  -BP      Transfer Training PT STG, Activity Type all transfers  -BP      Transfer Training PT STG, [place] Level supervision  required  -BP      Transfer Training PT STG, Assist Device walker, rolling  -BP      Gait Training PT STG    Gait Training Goal PT STG, Date Established 01/31/18  -BP      Gait Training Goal PT STG, Time to Achieve 5 days  -BP      Gait Training Goal PT STG, [place] Level supervision required  -BP      Gait Training Goal PT STG, Assist Device walker, rolling  -BP      Gait Training Goal PT STG, Distance to Achieve 100  -BP        User Key  (r) = Recorded By, (t) = Taken By, (c) = Cosigned By    Initials Name Provider Type    BP Aviva Burgos, PT Physical Therapist          Physical Therapy Education     Title: PT OT SLP Therapies (Active)     Topic: Physical Therapy (Active)     Point: Mobility training (Active)    Learning Progress Summary    Learner Readiness Method Response Comment Documented by Status   Patient Acceptance E NR   02/01/18 1006 Active    Acceptance E NR   01/31/18 1059 Active                      User Key     Initials Effective Dates Name Provider Type Discipline     12/01/15 -  Aviva Burgos, PT Physical Therapist PT     12/01/15 -  Isabelle Angeles, PT Physical Therapist PT                    PT Recommendation and Plan  Anticipated Equipment Needs At Discharge:  (will continue to assess)  Anticipated Discharge Disposition: skilled nursing facility  Planned Therapy Interventions: bed mobility training, gait training, home exercise program, strengthening, transfer training, patient/family education  PT Frequency: daily  Plan of Care Review  Plan Of Care Reviewed With: patient  Outcome Summary/Follow up Plan: PT: Patient requires significantly increased time to complete all tasks with frequent cues to remain on task.  Patient performs gait x30 feet with rolling walker, min assist with additional person to assist with equipment.  Patient continues to be impulsive throughout treatment session with repeated cues for proper use of walker.  Patient with overall poor ability to follow  commands throughout session.  Recommend 24 hour care at discharge due to cognitive delays and poor insight into deficits.          Outcome Measures       02/01/18 0847 01/31/18 0931 01/31/18 0930    How much help from another person do you currently need...    Turning from your back to your side while in flat bed without using bedrails? 3  -JW  3  -BP    Moving from lying on back to sitting on the side of a flat bed without bedrails? 3  -JW  3  -BP    Moving to and from a bed to a chair (including a wheelchair)? 3  -JW  3  -BP    Standing up from a chair using your arms (e.g., wheelchair, bedside chair)? 3  -JW  3  -BP    Climbing 3-5 steps with a railing? 2  -JW  2  -BP    To walk in hospital room? 2  -JW  2  -BP    AM-PAC 6 Clicks Score 16  -JW  16  -BP    How much help from another is currently needed...    Putting on and taking off regular lower body clothing?  2  -JJ     Bathing (including washing, rinsing, and drying)  2  -JJ     Toileting (which includes using toilet bed pan or urinal)  2  -JJ     Putting on and taking off regular upper body clothing  3  -JJ     Taking care of personal grooming (such as brushing teeth)  4  -JJ     Eating meals  4  -JJ     Score  17  -J     Functional Assessment    Outcome Measure Options AM-PAC 6 Clicks Basic Mobility (PT)  - AM-PAC 6 Clicks Daily Activity (OT)  - AM-PAC 6 Clicks Basic Mobility (PT)  -      User Key  (r) = Recorded By, (t) = Taken By, (c) = Cosigned By    Initials Name Provider Type    JSHANDRA Garza, OTR Occupational Therapist    BP Aviva Burgos, PT Physical Therapist    JW Isabelle Angeles, PT Physical Therapist           Time Calculation:         PT Charges       02/01/18 1010          Time Calculation    Start Time 0847  -      Stop Time 0915  -      Time Calculation (min) 28 min  -      PT Received On 02/01/18  -      PT - Next Appointment 02/02/18  -        User Key  (r) = Recorded By, (t) = Taken By, (c) = Cosigned By     Initials Name Provider Type    JW Isabelle Angeles, PT Physical Therapist          Therapy Charges for Today     Code Description Service Date Service Provider Modifiers Qty    13037222740 HC PT THER PROC EA 15 MIN 2/1/2018 Isabelle Angeles, PT GP 2          PT G-Codes  Outcome Measure Options: AM-PAC 6 Clicks Basic Mobility (PT)    Isabelle Angeles, PT  2/1/2018

## 2018-02-01 NOTE — NURSING NOTE
"Continued Stay Note  GINA Clement     Patient Name: Wiley Winston  MRN: [id number]  Today's Date: 2/1/2018    Admit Date: 1/27/2018          Discharge Plan       02/01/18 1023    Case Management/Social Work Plan    Additional Comments spoke with Courtney-significant other via telephone. she stated she made a call to daughter yesterday and daughter was to call son r/t discharge planning. Courtney stated she would reach out to daughter again this morning. emotional support provided during phone call. spoke with Elizabeth @ [facility] and she will be calling significant other this morning. also faxed \"General Power of \" paper work to Elizabeth. asset check came back good but before accepting she needs to speak to significant other today. will continue to follow.               Discharge Codes     None            Zo Terrell RN    "

## 2018-02-01 NOTE — PAYOR COMM NOTE
"Wiley Peterson (53 y.o. Male)     ATTN: SAMUEL NAVARRO  AUTH#[id number]  [facility] FOR APPROVAL OF ADDITIONAL DAYS. PLEASE REPLY TO CHANDU GUZMÁN AT FAX#[phone] OR PHONE#[phone]. THANK YOU.       Date of Birth Social Security Number Address Home Phone MRN    [id number]  [id number] [facility] [address] [phone] [id number]    Jainism Marital Status          None Single       Admission Date Admission Type Admitting Provider Attending Provider Department, Room/Bed    1/27/18 Emergency Ginny Lynn MD Engelhardt, Ginny Dinero MD [facility] MED SURG, 1415/1    Discharge Date Discharge Disposition Discharge Destination                      Attending Provider: Ginny Lynn MD     Allergies:  Lisinopril    Isolation:  None   Infection:  None   Code Status:  FULL    Ht:  188 cm (74\")   Wt:  138 kg (303 lb 6.4 oz)    Admission Cmt:  None   Principal Problem:  None                Active Insurance as of 1/27/2018     Primary Coverage     Payor Plan Insurance Group Employer/Plan Group    PASSPORT PASSPORT MEDICAID     Payor Plan Address Payor Plan Phone Number Effective From Effective To    [address] [phone] 12/1/2014     [place] [address]       Subscriber Name Subscriber Birth Date Member ID       WILEY PETERSON [id number]                 Emergency Contacts      (Rel.) Home Phone Work Phone Mobile Phone    Courtney Blank (Significant Other) [phone] -- --            Vital Signs (last 24 hours)       01/31 0700  -  02/01 0659 02/01 0700  -  02/01 0837   Most Recent    Temp (°F) 97.6 -  98      98.2     98.2 (36.8)    Heart Rate 88 -  99      105     105    Resp   18      8     8    /77 -  137/68      144/68     144/68    SpO2 (%) 93 -  100      99     99          Lines, Drains & Airways    Active LDAs     Name:   Placement date:   Placement time:   Site:   Days:    PICC Line - Double Lumen 01/29/18 1725 basilic vein " "(medial side of arm), right 5 Fr;length (specify)  01/29/18    0877      2                Hospital Medications (active)       Dose Frequency Start End    b complex-C-folic acid capsule 1 mg 1 capsule Daily 1/28/2018     Sig - Route: Take 1 capsule by mouth Daily. - Oral    chlordiazePOXIDE (LIBRIUM) capsule 25 mg 25 mg Every 8 Hours PRN 1/30/2018 2/9/2018    Sig - Route: Take 1 capsule by mouth Every 8 (Eight) Hours As Needed for Anxiety or Withdrawal. - Oral    folic acid (FOLVITE) tablet 1 mg 1 mg Daily 1/27/2018     Sig - Route: Take 1 tablet by mouth Daily. - Oral    furosemide (LASIX) injection 40 mg 40 mg Every 12 Hours 1/29/2018     Sig - Route: Infuse 4 mL into a venous catheter Every 12 (Twelve) Hours. - Intravenous    gabapentin (NEURONTIN) capsule 600 mg 600 mg Nightly 1/27/2018     Sig - Route: Take 2 capsules by mouth Every Night. - Oral    lactulose solution 20 g 20 g 4 Times Daily - RT 1/31/2018     Sig - Route: Take 30 mL by mouth 4 (Four) Times a Day. - Oral    Magnesium Sulfate 2 gram infusion- Mg 1.6 - 1.9 mg/dL 2 g As Needed 1/28/2018     Sig - Route: Infuse 50 mL into a venous catheter As Needed (Mg 1.6 - 1.9 mg/dL). - Intravenous    Linked Group 1:  \"Or\" Linked Group Details        magnesium sulfate 3 gram infusion (1gm x 3) - Mg 1.1 - 1.5 mg/dL 1 g As Needed 1/28/2018     Sig - Route: Infuse 100 mL into a venous catheter As Needed (Mg 1.1 - 1.5 mg/dL). - Intravenous    Linked Group 1:  \"Or\" Linked Group Details        magnesium sulfate 4 gram infusion - Mg less than or equal to 1mg/dL 4 g As Needed 1/28/2018     Sig - Route: Infuse 100 mL into a venous catheter As Needed (Mg less than or equal to 1mg/dL). - Intravenous    Linked Group 1:  \"Or\" Linked Group Details        pantoprazole (PROTONIX) EC tablet 40 mg 40 mg Daily 1/27/2018     Sig - Route: Take 1 tablet by mouth Daily. - Oral    Pharmacy Consult - Pharmacy to dose  Continuous PRN 1/27/2018     Sig - Route: Continuous As Needed for " "Consult. - Does not apply    Pharmacy to dose vancomycin  Continuous PRN 1/27/2018 2/10/2018    Sig - Route: Continuous As Needed for Consult. - Does not apply    Linked Group 2:  \"Followed by\" Linked Group Details        piperacillin-tazobactam (ZOSYN) 3.375 g/100 mL 0.9% NS IVPB (mbp) 3.375 g Every 8 Hours 1/27/2018 2/10/2018    Sig - Route: Infuse 100 mL into a venous catheter Every 8 (Eight) Hours. - Intravenous    pneumococcal polysaccharide 23-valent (PNEUMOVAX-23) vaccine 0.5 mL 0.5 mL During Hospitalization 1/28/2018     Sig - Route: Inject 0.5 mL into the shoulder, thigh, or buttocks During Hospitalization for Immunization. - Intramuscular    Cosign for Ordering: Accepted by Ginny Lynn MD on 1/30/2018 11:49 PM    potassium chloride (K-DUR,KLOR-CON) CR tablet 40 mEq 40 mEq As Needed 1/28/2018     Sig - Route: Take 2 tablets by mouth As Needed (potassium replacement.  see admin instructions). - Oral    Linked Group 3:  \"Or\" Linked Group Details        potassium chloride (KLOR-CON) packet 40 mEq 40 mEq As Needed 1/28/2018     Sig - Route: Take 40 mEq by mouth As Needed (potassium replacement, see admin instructions). - Oral    Linked Group 3:  \"Or\" Linked Group Details        potassium chloride 10 mEq in 100 mL IVPB 10 mEq Every 1 Hour PRN 1/28/2018     Sig - Route: Infuse 100 mL into a venous catheter Every 1 (One) Hour As Needed (potassium protocol PERIPHERAL - see admin instructions). - Intravenous    Linked Group 3:  \"Or\" Linked Group Details        potassium phosphate 15 mmol in sodium chloride 0.9 % 100 mL infusion 15 mmol As Needed 1/28/2018     Sig - Route: Infuse 15 mmol into a venous catheter As Needed (Peripheral IV - Phosphorus 1.8 - 2.5). - Intravenous    Linked Group 4:  \"Or\" Linked Group Details        potassium phosphate 30 mmol in sodium chloride 0.9 % 250 mL infusion 30 mmol As Needed 1/28/2018     Sig - Route: Infuse 30 mmol into a venous catheter As Needed (Peripheral IV - " "Phosphorus 1.3 - 1.7). - Intravenous    Linked Group 4:  \"Or\" Linked Group Details        potassium phosphate 45 mmol in sodium chloride 0.9 % 500 mL infusion 45 mmol As Needed 1/28/2018     Sig - Route: Infuse 45 mmol into a venous catheter As Needed (Peripheral IV - Phosphorus Less Than 1.3). - Intravenous    Linked Group 4:  \"Or\" Linked Group Details        prenatal vitamin 27-0.8 tablet 1 tablet 1 tablet Daily 1/28/2018     Sig - Route: Take 1 tablet by mouth Daily. - Oral    rifAXIMin (XIFAXAN) tablet 600 mg 600 mg Every 12 Hours Scheduled 1/27/2018     Sig - Route: Take 3 tablets by mouth Every 12 (Twelve) Hours. - Oral    sodium chloride 0.9 % flush 1-10 mL 1-10 mL As Needed 1/27/2018     Sig - Route: Infuse 1-10 mL into a venous catheter As Needed for Line Care. - Intravenous    sodium chloride 0.9 % flush 10 mL 10 mL As Needed 1/27/2018     Sig - Route: Infuse 10 mL into a venous catheter As Needed for Line Care. - Intravenous    Linked Group 5:  \"And\" Linked Group Details        sodium chloride 0.9 % infusion 40 mL 40 mL As Needed 1/27/2018     Sig - Route: Infuse 40 mL into a venous catheter As Needed for Line Care. - Intravenous    sodium phosphates 15 mmol in sodium chloride 0.9 % 250 mL IVPB 15 mmol As Needed 1/28/2018     Sig - Route: Infuse 15 mmol into a venous catheter As Needed (Peripheral IV - Phosphorus 1.8 - 2.5 & Potassium Greater Than 4). - Intravenous    Linked Group 4:  \"Or\" Linked Group Details        sodium phosphates 30 mmol in sodium chloride 0.9 % 250 mL IVPB 30 mmol As Needed 1/28/2018     Sig - Route: Infuse 30 mmol into a venous catheter As Needed (Peripheral IV - Phosphorus 1.3-1.7 & Potassium Greater Than 4). - Intravenous    Linked Group 4:  \"Or\" Linked Group Details        sodium phosphates 45 mmol in sodium chloride 0.9 % 500 mL IVPB 45 mmol As Needed 1/28/2018     Sig - Route: Infuse 45 mmol into a venous catheter As Needed (Peripheral IV - Phosphorus Less Than 1.3 & Potassium " "Greater Than 4). - Intravenous    Linked Group 4:  \"Or\" Linked Group Details        spironolactone (ALDACTONE) tablet 200 mg 200 mg Daily 1/31/2018     Sig - Route: Take 2 tablets by mouth Daily. - Oral    tamsulosin (FLOMAX) 24 hr capsule 0.4 mg 0.4 mg Nightly 1/27/2018     Sig - Route: Take 1 capsule by mouth Every Night. - Oral    temazepam (RESTORIL) capsule 15 mg 15 mg Nightly PRN 1/27/2018     Sig - Route: Take 1 capsule by mouth At Night As Needed for Sleep. - Oral    thiamine (VITAMIN B-1) tablet 100 mg 100 mg Daily 1/27/2018     Sig - Route: Take 1 tablet by mouth Daily. - Oral    traZODone (DESYREL) tablet 50 mg 50 mg Nightly 1/27/2018     Sig - Route: Take 1 tablet by mouth Every Night. - Oral    vancomycin (VANCOCIN) 1,250 mg in sodium chloride 0.9 % 250 mL IVPB 1,250 mg Every 8 Hours 2/1/2018 2/8/2018    Sig - Route: Infuse 1,250 mg into a venous catheter Every 8 (Eight) Hours. - Intravenous    zinc sulfate (ZINCATE) capsule 220 mg 220 mg 2 Times Daily (BID) 1/29/2018     Sig - Route: Take 1 capsule by mouth 2 (Two) Times a Day. - Oral    lactulose solution 20 g (Discontinued) 20 g 2 Times Daily (BID) 1/29/2018 1/31/2018    Sig - Route: Take 30 mL by mouth 2 (Two) Times a Day. - Oral    vancomycin IVPB 1500 mg in 0.9% NaCl (Premix) 250 mL (Discontinued) 1,500 mg Every 8 Hours 1/30/2018 1/31/2018    Sig - Route: Infuse 250 mL into a venous catheter Every 8 (Eight) Hours. - Intravenous    Reason for Discontinue: Dose adjustment            Lab Results (last 24 hours)     Procedure Component Value Units Date/Time    Potassium, Urine, Random - Urine, Clean Catch [[id number]] Collected:  01/31/18 1614    Specimen:  Urine from Urine, Clean Catch Updated:  01/31/18 1641     Potassium, Urine 22.1 mmol/L     Sodium, Urine, Random - Urine, Clean Catch [[id number]] Collected:  01/31/18 1614    Specimen:  Urine from Urine, Clean Catch Updated:  01/31/18 1641     Sodium, Urine 34 mmol/L     Vancomycin, Random " [[id number]]  (Normal) Collected:  01/31/18 1652    Specimen:  Blood Updated:  01/31/18 1739     Vancomycin Random 27.90 mcg/mL     Protime-INR [[id number]]  (Abnormal) Collected:  02/01/18 0354    Specimen:  Blood Updated:  02/01/18 0422     Protime 25.0 (H) Seconds      INR 2.22 (H)    Narrative:       Therapeutic Ranges for INR: 2.0-3.0 (PT 20-30)                              2.5-3.5 (PT 25-34)    Comprehensive Metabolic Panel [[id number]]  (Abnormal) Collected:  02/01/18 0354    Specimen:  Blood Updated:  02/01/18 0438     Glucose 112 (H) mg/dL      BUN 13 mg/dL      Creatinine 1.15 mg/dL      Sodium 130 (L) mmol/L      Potassium 3.6 mmol/L      Chloride 97 (L) mmol/L      CO2 23.5 mmol/L      Calcium 7.6 (L) mg/dL      Total Protein 5.7 (L) g/dL      Albumin 2.00 (L) g/dL      ALT (SGPT) 32 U/L      AST (SGOT) 61 (H) U/L      Alkaline Phosphatase 177 (H) U/L      Total Bilirubin 3.9 (H) mg/dL      eGFR Non African Amer 67 mL/min/1.73      Globulin 3.7 gm/dL      A/G Ratio 0.5 g/dL      BUN/Creatinine Ratio 11.3     Anion Gap 9.5 mmol/L     Ammonia [[id number]]  (Abnormal) Collected:  02/01/18 0354    Specimen:  Blood Updated:  02/01/18 0439     Ammonia 91 (H) umol/L     CBC Auto Differential [[id number]]  (Abnormal) Collected:  02/01/18 0354    Specimen:  Blood Updated:  02/01/18 0556     WBC 7.74 10*3/mm3      RBC 2.67 (L) 10*6/mm3      Hemoglobin 8.8 (L) g/dL      Hematocrit 24.6 (L) %      MCV 92.1 fL      MCH 33.0 (H) pg      MCHC 35.8 g/dL      RDW 19.0 (H) %      RDW-SD 63.4 (H) fl      MPV 10.3 fL      Platelets 57 (L) 10*3/mm3      Neutrophil % 49.1 %      Lymphocyte % 33.1 %      Monocyte % 13.8 (H) %      Eosinophil % 2.8 %      Basophil % 0.6 %      Immature Grans % 0.6 (H) %      Neutrophils, Absolute 3.79 10*3/mm3      Lymphocytes, Absolute 2.56 10*3/mm3      Monocytes, Absolute 1.07 (H) 10*3/mm3      Eosinophils, Absolute 0.22 10*3/mm3      Basophils, Absolute 0.05 10*3/mm3      Immature Grans,  Absolute 0.05 (H) 10*3/mm3      nRBC 0.3 (H) /100 WBC     CBC & Differential [[id number]] Collected:  02/01/18 0354    Specimen:  Blood Updated:  02/01/18 0627    Narrative:       The following orders were created for panel order CBC & Differential.  Procedure                               Abnormality         Status                     ---------                               -----------         ------                     Scan Slide[[id number]]                                       Final result               CBC Auto Differential[[id number]]        Abnormal            Final result                 Please view results for these tests on the individual orders.    Scan Slide [[id number]] Collected:  02/01/18 0354    Specimen:  Blood Updated:  02/01/18 0627     Poikilocytes Slight/1+     Target Cells Slight/1+     WBC Morphology Normal     Platelet Morphology Normal    Wound Culture - Surgical Site, Arm, Left [[id number]]  (Normal) Collected:  01/29/18 0104    Specimen:  Surgical Site from Arm, Left Updated:  02/01/18 0727     Wound Culture No growth at 3 days     Gram Stain Result Few (2+) WBCs seen      No organisms seen    AFP Tumor Marker [[id number]] Collected:  01/31/18 0605    Specimen:  Blood Updated:  02/01/18 0822     AFP Tumor Marker 4.7 ng/mL       Roche ECLIA methodology       Narrative:       Performed at:  [address] [place] [address]  [id number]  : Jayden Alba PhD, Phone:  [phone]             Physician Progress Notes (last 24 hours) (Notes from 1/31/2018  8:37 AM through 2/1/2018  8:37 AM)      Deyvi Caceres MD at 1/31/2018  2:54 PM  Version 1 of 1         GI Daily Progress Note    Assessment/Plan:    Active Problems:    Diarrhea       LOS: 4 days     Wiley Winston is a 53 y.o. male who was admitted with Septic Bursitis . He reports his symptoms are improving with treatment. Elbow partially dressed and draining. Plans per Ortho. Is more awake and is talking  about a rehab stay for 30 days prior to going home. Weight stable and his sodium is up to 131 on fluid restiction    Subjective:    Patient expresses Abdominal distension  Patient denies abdominal pain, vomiting, diarrhea and bloody stools    Objective:    Vital signs in last 24 hours:  Temp:  [97 °F (36.1 °C)-98.4 °F (36.9 °C)] 98 °F (36.7 °C)  Heart Rate:  [] 88  Resp:  [18-20] 18  BP: (130-150)/(60-81) 130/77    Intake/Output last 3 shifts:  I/O last 3 completed shifts:  In: 1660 [P.O.:960; IV Piggyback:700]  Out: 976 [Urine:976]  Intake/Output this shift:  I/O this shift:  In: 350 [IV Piggyback:350]  Out: 401 [Urine:400; Stool:1]    Results from last 7 days  Lab Units 01/30/18  0532 01/28/18  1341 01/27/18  0839   WBC 10*3/mm3 8.80 8.57 6.86   HEMOGLOBIN g/dL 9.1* 9.7* 9.7*   HEMATOCRIT % 25.5* 27.1* 26.9*   PLATELETS 10*3/mm3 77* 75* 76*       Results from last 7 days  Lab Units 01/30/18  0532 01/29/18  0820 01/28/18  1341 01/27/18  2030   SODIUM mmol/L 131*  --  127* 126*   POTASSIUM mmol/L 4.0 4.0 3.4* 2.7*   CHLORIDE mmol/L 98  --  92* 91*   CO2 mmol/L 22.2  --  22.3 24.2   BUN mg/dL 17  --  18 17   CREATININE mg/dL 1.20  --  1.44* 1.43*   GLUCOSE mg/dL 101*  --  158* 174*   CALCIUM mg/dL 7.5*  --  7.7* 7.7*       Physical Exam:Abdomen  Sounds Normal Active Bowel Sounds   Distension Soft and Distended   Tenderness Mildly Tender     LUE Septic Bursitis of Elbow  Alcoholic Hepatitis  Ascites  Hyponatremia  PSE    Continue present meds for now, is clinically improved so will follow along while in Patient.     Electronically signed by Deyvi Caceres MD at 1/31/2018  3:03 PM      GARY Mosquera at 1/31/2018  5:53 PM  Version 1 of 1    Attestation signed by Ginny Lynn MD at 1/31/2018  9:00 PM        I have reviewed nurse practitioners note and agree with her diagnoses and treatments.                                SERVICE: [facility]  "HOSPITALIST    CONSULTANTS: GI/ortho/nutrition/surgery    CHIEF COMPLAINT: f/u septic bursitis left elbow, ascites, diarrhea    SUBJECTIVE: The patient reports he is feeling well. Noted to be sitting on bedside commode at time of exam. Patient states \"you'll have to check my butt\" when asked how are you doing, patient puts bare hand into buttock folds. Staff reports patient constantly calling out with requests. No note of other concerns.     OBJECTIVE:    /68 (BP Location: Left arm, Patient Position: Lying)  Pulse 97  Temp 97.6 °F (36.4 °C) (Oral)   Resp 18  Ht 188 cm (74\")  Wt (!) 136 kg (300 lb 9.6 oz)  SpO2 100%  BMI 38.59 kg/m2    MEDS/LABS REVIEWED AND ORDERED    b complex-C-folic acid 1 capsule Oral Daily   folic acid 1 mg Oral Daily   furosemide 40 mg Intravenous Q12H   gabapentin 600 mg Oral Nightly   lactulose 20 g Oral 4x Daily - RT   pantoprazole 40 mg Oral Daily   piperacillin-tazobactam 3.375 g Intravenous Q8H   prenatal vitamin 27-0.8 1 tablet Oral Daily   rifaximin 600 mg Oral Q12H   spironolactone 200 mg Oral Daily   tamsulosin 0.4 mg Oral Nightly   thiamine 100 mg Oral Daily   traZODone 50 mg Oral Nightly   [START ON 2018] vancomycin 1,250 mg Intravenous Q8H   zinc sulfate 220 mg Oral BID     Physical Exam   Constitutional: He appears well-developed and well-nourished.   jaundiced   HENT:   Head: Normocephalic and atraumatic.   Eyes: EOM are normal. Pupils are equal, round, and reactive to light.   Cardiovascular: Normal rate and regular rhythm.    Pulmonary/Chest: Effort normal.   Diminished bilateral breath sounds   Abdominal:   Firm, non-tender, +BS   Musculoskeletal:   3+ pitting edema bilateral LEs   Neurological: He is alert.   Oriented to self/   Skin: Skin is warm and dry. No erythema.   Psychiatric:   confused   Vitals reviewed.    LAB/DIAGNOSTICS:    Lab Results (last 24 hours)     Procedure Component Value Units Date/Time    AFP Tumor Marker [[id number]] Collected:  " 01/31/18 0605    Specimen:  Blood Updated:  01/31/18 0623    Wound Culture - Surgical Site, Arm, Left [[id number]]  (Normal) Collected:  01/29/18 0104    Specimen:  Surgical Site from Arm, Left Updated:  01/31/18 0655     Wound Culture No growth at 2 days     Gram Stain Result Few (2+) WBCs seen      No organisms seen    Potassium, Urine, Random - Urine, Clean Catch [[id number]] Collected:  01/31/18 1614    Specimen:  Urine from Urine, Clean Catch Updated:  01/31/18 1641     Potassium, Urine 22.1 mmol/L     Sodium, Urine, Random - Urine, Clean Catch [[id number]] Collected:  01/31/18 1614    Specimen:  Urine from Urine, Clean Catch Updated:  01/31/18 1641     Sodium, Urine 34 mmol/L     Vancomycin, Random [[id number]]  (Normal) Collected:  01/31/18 1652    Specimen:  Blood Updated:  01/31/18 1739     Vancomycin Random 27.90 mcg/mL         ASSESSMENT/PLAN:  1. Septic Bursitis of left elbow: ortho following  Continues on vancomycin, Zosyn  Wound culture no growth at 2 days    2. Alcoholic Cirrhosis/Hepatitis  3. Ascites/hyponatremia: GI following  Continues on Xifaxan, Lasix, spironolactone, Librium  Lactulose increased to qid  Sodium 131 yesterday, recheck in a.m.  Continue cardiac consistent carbohydrate low sodium diet  Discuss possible paracentesis tomorrow if INR 2 or below    4. Hepatic encephalopathy/moderate cognitive deficiencies:   SLP following  Ammonia 76 yesterday, repeat in am, lactulose increased as above    5. Diarrhea: improved, C. Difficile negative, on lactulose with loose stools    6. Electrolyte imbalance:   Continues on electrolyte replacement protocols for potassium, phosphorus and magnesium    7. Chronic anemia, thrombocytopenia, coagulopathy:  Continues on folic acid/thiamine  Hemoglobin stable at 9.1, recheck in a.m.    8. Hypertension: at goal on medications as above    9. BPH: no acute issues on Flomax    10. GERD: no acute issues on Protonix    11. Tobacco abuse: no acute issues    12. Chronic  back pain: SNF placement recommended at discharge  PT/OT ongoing  Continued on Neurontin 600 mg nightly  No acute concerns tonight               Electronically signed by Ginny Lynn MD at 1/31/2018  9:00 PM      Hakan Sellers MD at 1/31/2018  5:58 PM  Version 1 of 1         Reviewed patient's CT scan with contrast, he was unable to tolerate MRI of left elbow.  No evidence of osteomyelitis on my review or on radiology report.  Unfortunately given patient's significance comorbidities as well as the chronic nature of this wound, I would recommend wound care to attempt to provide healing and closure of this wound.  Ability to provide soft tissue coverage and success rate of healing of any olecranon bursa excision would be very poor especially in light of his coagulopathy, thrombocytopenia, and low albumin.    Dressing recommendations: Iodoform gauze waking with dressing change daily.  No growth from wound cultures noted, antibiotics per recommendation from hospitalist.    Follow-up in office in 3-4 weeks for reassessment.     Electronically signed by Hakan Sellers MD at 1/31/2018  6:00 PM

## 2018-02-01 NOTE — NURSING NOTE
"Continued Stay Note   Sarah Hardin     Patient Name: Wiley Winston  MRN: [id number]  Today's Date: 2/1/2018    Admit Date: 1/27/2018          Discharge Plan       02/01/18 1239    Case Management/Social Work Plan    Plan accepted to [facility]     Additional Comments spoke with Elizabeth @ [facility] and patient has been accepted to facility. spoke with significant other Courtney and she is on her way to the hospital, awaiting her arrival. updated Dorina VEGA, anticipated dc 2/2/18. will continue to follow.       02/01/18 1023    Case Management/Social Work Plan    Additional Comments spoke with Courtney-significant other via telephone. she stated she made a call to daughter yesterday and daughter was to call son r/t discharge planning. Courtney stated she would reach out to daughter again this morning. emotional support provided during phone call. spoke with [facility] @ [facility] and she will be calling significant other this morning. also faxed \"General Power of \" paper work to Elizabeth. asset check came back good but before accepting she needs to speak to significant other today. will continue to follow.               Discharge Codes     None            Zo Terrell RN    "

## 2018-02-02 VITALS
WEIGHT: 298.06 LBS | BODY MASS INDEX: 38.25 KG/M2 | SYSTOLIC BLOOD PRESSURE: 137 MMHG | HEIGHT: 74 IN | RESPIRATION RATE: 20 BRPM | HEART RATE: 95 BPM | TEMPERATURE: 97.6 F | DIASTOLIC BLOOD PRESSURE: 69 MMHG | OXYGEN SATURATION: 99 %

## 2018-02-02 LAB
ALBUMIN SERPL-MCNC: 2 G/DL (ref 3.5–5.2)
ALBUMIN/GLOB SERPL: 0.5 G/DL
ALP SERPL-CCNC: 168 U/L (ref 40–129)
ALT SERPL W P-5'-P-CCNC: 32 U/L (ref 5–41)
AMMONIA BLD-SCNC: 64 UMOL/L (ref 16–60)
ANION GAP SERPL CALCULATED.3IONS-SCNC: 5.4 MMOL/L
AST SERPL-CCNC: 62 U/L (ref 5–40)
BASOPHILS # BLD AUTO: 0.05 10*3/MM3 (ref 0–0.2)
BASOPHILS NFR BLD AUTO: 0.7 % (ref 0–2)
BILIRUB SERPL-MCNC: 3.8 MG/DL (ref 0.2–1.2)
BUN BLD-MCNC: 11 MG/DL (ref 6–20)
BUN/CREAT SERPL: 12 (ref 7–25)
CALCIUM SPEC-SCNC: 7.8 MG/DL (ref 8.6–10.5)
CHLORIDE SERPL-SCNC: 101 MMOL/L (ref 98–107)
CO2 SERPL-SCNC: 26.6 MMOL/L (ref 22–29)
CREAT BLD-MCNC: 0.92 MG/DL (ref 0.76–1.27)
DEPRECATED RDW RBC AUTO: 64.8 FL (ref 37–54)
EOSINOPHIL # BLD AUTO: 0.15 10*3/MM3 (ref 0.1–0.3)
EOSINOPHIL NFR BLD AUTO: 2.2 % (ref 0–4)
ERYTHROCYTE [DISTWIDTH] IN BLOOD BY AUTOMATED COUNT: 19.1 % (ref 11.5–14.5)
GFR SERPL CREATININE-BSD FRML MDRD: 86 ML/MIN/1.73
GLOBULIN UR ELPH-MCNC: 3.8 GM/DL
GLUCOSE BLD-MCNC: 99 MG/DL (ref 65–99)
HCT VFR BLD AUTO: 23.7 % (ref 42–52)
HGB BLD-MCNC: 8.5 G/DL (ref 14–18)
IMM GRANULOCYTES # BLD: 0.04 10*3/MM3 (ref 0–0.03)
IMM GRANULOCYTES NFR BLD: 0.6 % (ref 0–0.5)
INR PPP: 2.31 (ref 0.9–1.1)
LYMPHOCYTES # BLD AUTO: 2.5 10*3/MM3 (ref 0.6–4.8)
LYMPHOCYTES NFR BLD AUTO: 37.1 % (ref 20–45)
MCH RBC QN AUTO: 33.2 PG (ref 27–31)
MCHC RBC AUTO-ENTMCNC: 35.9 G/DL (ref 31–37)
MCV RBC AUTO: 92.6 FL (ref 80–94)
MONOCYTES # BLD AUTO: 1.21 10*3/MM3 (ref 0–1)
MONOCYTES NFR BLD AUTO: 18 % (ref 3–8)
NEUTROPHILS # BLD AUTO: 2.79 10*3/MM3 (ref 1.5–8.3)
NEUTROPHILS NFR BLD AUTO: 41.4 % (ref 45–70)
NRBC BLD MANUAL-RTO: 0 /100 WBC (ref 0–0)
PLATELET # BLD AUTO: 52 10*3/MM3 (ref 140–500)
PMV BLD AUTO: 9.9 FL (ref 7.4–10.4)
POTASSIUM BLD-SCNC: 3.9 MMOL/L (ref 3.5–5.2)
PROCALCITONIN SERPL-MCNC: 0.18 NG/ML (ref 0.1–0.25)
PROT SERPL-MCNC: 5.8 G/DL (ref 6–8.5)
PROTHROMBIN TIME: 25.8 SECONDS (ref 12.1–15)
RBC # BLD AUTO: 2.56 10*6/MM3 (ref 4.7–6.1)
SODIUM BLD-SCNC: 133 MMOL/L (ref 136–145)
VANCOMYCIN SERPL-MCNC: 17.9 MCG/ML (ref 5–40)
WBC NRBC COR # BLD: 6.74 10*3/MM3 (ref 4.8–10.8)

## 2018-02-02 PROCEDURE — 80053 COMPREHEN METABOLIC PANEL: CPT | Performed by: NURSE PRACTITIONER

## 2018-02-02 PROCEDURE — 84145 PROCALCITONIN (PCT): CPT | Performed by: NURSE PRACTITIONER

## 2018-02-02 PROCEDURE — 85610 PROTHROMBIN TIME: CPT | Performed by: NURSE PRACTITIONER

## 2018-02-02 PROCEDURE — 80202 ASSAY OF VANCOMYCIN: CPT | Performed by: HOSPITALIST

## 2018-02-02 PROCEDURE — 99239 HOSP IP/OBS DSCHRG MGMT >30: CPT | Performed by: NURSE PRACTITIONER

## 2018-02-02 PROCEDURE — 82140 ASSAY OF AMMONIA: CPT | Performed by: NURSE PRACTITIONER

## 2018-02-02 PROCEDURE — 85025 COMPLETE CBC W/AUTO DIFF WBC: CPT | Performed by: NURSE PRACTITIONER

## 2018-02-02 RX ORDER — FUROSEMIDE 40 MG/1
40 TABLET ORAL
Qty: 60 TABLET | Refills: 0
Start: 2018-02-02

## 2018-02-02 RX ORDER — SPIRONOLACTONE 100 MG/1
200 TABLET, FILM COATED ORAL DAILY
Start: 2018-02-02

## 2018-02-02 RX ORDER — LACTULOSE 20 G/30ML
20 SOLUTION ORAL
Qty: 30 ML
Start: 2018-02-02

## 2018-02-02 RX ORDER — ZINC SULFATE 50(220)MG
220 CAPSULE ORAL 2 TIMES DAILY
Start: 2018-02-02

## 2018-02-02 RX ORDER — FUROSEMIDE 40 MG/1
40 TABLET ORAL
Status: DISCONTINUED | OUTPATIENT
Start: 2018-02-02 | End: 2018-02-02 | Stop reason: HOSPADM

## 2018-02-02 RX ADMIN — FUROSEMIDE 40 MG: 40 TABLET ORAL at 10:09

## 2018-02-02 RX ADMIN — SPIRONOLACTONE 200 MG: 100 TABLET, FILM COATED ORAL at 08:41

## 2018-02-02 RX ADMIN — LACTULOSE 20 G: 20 SOLUTION ORAL at 12:24

## 2018-02-02 RX ADMIN — LACTULOSE 20 G: 20 SOLUTION ORAL at 08:41

## 2018-02-02 RX ADMIN — PRENATAL VIT W/ FE FUMARATE-FA TAB 27-0.8 MG 1 TABLET: 27-0.8 TAB at 08:41

## 2018-02-02 RX ADMIN — RIFAXIMIN 600 MG: 200 TABLET ORAL at 10:08

## 2018-02-02 RX ADMIN — PANTOPRAZOLE SODIUM 40 MG: 40 TABLET, DELAYED RELEASE ORAL at 08:41

## 2018-02-02 RX ADMIN — FOLIC ACID 1 MG: 1 TABLET ORAL at 08:41

## 2018-02-02 RX ADMIN — ZINC SULFATE CAP 220 MG (50 MG ELEMENTAL ZN) 220 MG: 220 (50 ZN) CAP at 08:41

## 2018-02-02 RX ADMIN — Medication 100 MG: at 08:41

## 2018-02-02 RX ADMIN — ASCORBIC ACID, THIAMINE MONONITRATE,RIBOFLAVIN, NIACINAMIDE, PYRIDOXINE HYDROCHLORIDE, FOLIC ACID, CYANOCOBALAMIN, BIOTIN, CALCIUM PANTOTHENATE, 1 MG: 100; 1.5; 1.7; 20; 10; 1; 6000; 150000; 5 CAPSULE, LIQUID FILLED ORAL at 08:41

## 2018-02-02 NOTE — PLAN OF CARE
Problem: Inpatient Physical Therapy  Goal: Bed Mobility Goal STG- PT  Outcome: Unable to achieve outcome(s) by discharge Date Met: 02/02/18 01/31/18 1100 02/02/18 1023   Bed Mobility PT STG   Bed Mobility PT STG, Date Established 01/31/18 --    Bed Mobility PT STG, Time to Achieve 5 days --    Bed Mobility PT STG, Activity Type supine to sit/sit to supine --    Bed Mobility PT STG, Gibson Level conditional independence --    Bed Mobility PT STG, Date Goal Reviewed --  02/02/18   Bed Mobility PT STG, Outcome --  goal not met   Bed Mobility PT STG, Reason Goal Not Met --  discharged from facility     Goal: Transfer Training Goal 1 STG- PT  Outcome: Unable to achieve outcome(s) by discharge Date Met: 02/02/18 01/31/18 1100 02/02/18 1023   Transfer Training PT STG   Transfer Training PT STG, Date Established 01/31/18 --    Transfer Training PT STG, Time to Achieve 5 days --    Transfer Training PT STG, Activity Type all transfers --    Transfer Training PT STG, Gibson Level supervision required --    Transfer Training PT STG, Assist Device walker, rolling --    Transfer Training PT STG, Date Goal Reviewed --  02/02/18   Transfer Training PT STG, Outcome --  goal not met   Transfer Training PT STG, Reason Goal Not Met --  discharged from facility     Goal: Gait Training Goal STG- PT  Outcome: Unable to achieve outcome(s) by discharge Date Met: 02/02/18 01/31/18 1100 02/02/18 1023   Gait Training PT STG   Gait Training Goal PT STG, Date Established 01/31/18 --    Gait Training Goal PT STG, Time to Achieve 5 days --    Gait Training Goal PT STG, Gibson Level supervision required --    Gait Training Goal PT STG, Assist Device walker, rolling --    Gait Training Goal PT STG, Distance to Achieve 100 --    Gait Training Goal PT STG, Date Goal Reviewed --  02/02/18   Gait Training Goal PT STG, Outcome --  goal not met   Gait Training Goal PT STG, Reason Goal Not Met --  discharged from facility

## 2018-02-02 NOTE — NURSING NOTE
Case Management Discharge Note    Final Note: discharged to [facility] level of [facility].     Discharge Placement     Facility/Agency Request Status Selected? Address Phone Number Fax Number    [facility] Accepted    [id number] [place] [id number] [phone]             Discharge Codes: 04  Discharged/transferred to intermediate care facility (ICF)

## 2018-02-02 NOTE — NURSING NOTE
Pt d/c to [facility] in [place], [place], report called to [employer], LPN, pt a/o x 3, with periods of confusion, easily reoriented, picc line d/c, pt tolerated without diff, transferred by EMS

## 2018-02-02 NOTE — DISCHARGE SUMMARY
Wiley Winston  1965  [id number]    Hospitalists Discharge Summary    Date of Admission: 1/27/2018  Date of Discharge:  2/2/2018    Primary Discharge Diagnoses:   1. Septic Bursitis of left elbow   2. Alcoholic Cirrhosis/Hepatitis/ESLD:  3. Anasarca/Ascites/hyponatremia: GI following  4. Hepatic encephalopathy/moderate cognitive deficiencies    5. Diarrhea  Secondary Discharge Diagnoses:     6. Electrolyte imbalance    7. Chronic anemia, thrombocytopenia, coagulopathy    8. Hypertension    9. BPH    10. GERD    11. Tobacco abuse  12. Chronic back pain  13. ANDREW  14. Obesity  PCP  Patient Care Team:  Debi Dodson MD as PCP - General (Family Medicine)    Consults:   Consults     Date and Time Order Name Status Description    1/30/2018 1635 Inpatient Consult to Gastroenterology Completed     1/29/2018 2059 Inpatient Consult to Gastroenterology      1/27/2018 1159 Inpatient Consult to Orthopedic Surgery Completed     1/27/2018 1055 Inpatient Consult to General Surgery Completed         Operations and Procedures Performed:     Xr Elbow 3+ View Left    Result Date: 1/22/2018  Narrative: INDICATION: Fall 3 months ago. Open wound the left elbow. Left elbow pain..  COMPARISON: None available.  FINDINGS: 3 views of the left elbow.  No fracture, dislocation, or effusion. There is some subcutaneous gas underlying a left elbow laceration. Patient has reported history of an open wound. No osseous erosions.  No foreign body.      Impression: Small volume subcutaneous gas associated with the superficial wound/laceration. The gas is fairly localized and does not track into the adjacent soft tissues. Correlate for any evidence of abscess.  This report was finalized on 1/22/2018 12:01 PM by Dr. Arnie Parson MD.      Us Abdomen Complete    Result Date: 1/30/2018  Narrative: US ABDOMEN COMPLETE-: 1/30/2018 10:04 AM  INDICATION: Cirrhosis. Ascites. Confusion  COMPARISON: Right upper quadrant ultrasound 08/06/2013   FINDINGS: PANCREAS: The pancreas is obscured due to overlying bowel gas.  LIVER: Coarsened echotexture of the hepatic parenchyma is indicative of background cirrhosis.. No hepatic mass. The intrahepatic bile ducts are normal in caliber. The common duct is not visualized at the glenna hepatis.  GALLBLADDER: The gallbladder is normal. No gallstones.  KIDNEYS: The right kidney measures 11.2 cm. The left kidney measures 11.8 cm.  Renal cortical thickness and echogenicity is normal. No hydronephrosis.  SPLEEN: The spleen is normal in size.   OTHER: The abdominal aorta is normal in size.  The juxtahepatic IVC is within normal limits. Moderate volume of ascites.      Impression:  1. Cirrhotic morphology to liver. 2. Moderate ascites.  This report was finalized on 1/30/2018 11:35 AM by Dr. Arnie Parson MD.      Xr Chest 1 View    Result Date: 1/30/2018  Narrative: INDICATION: PICC placement  COMPARISON:  12/16/2016  FINDINGS: Single portable AP view of the chest.   Right PICC terminates over the cavoatrial junction.  Heart and mediastinal contours are normal. The lungs are clear. No pneumothorax or pleural effusion.      Impression: Right PICC terminates over the cavoatrial junction.   Initial interpretation provided by Dr. Isidro Singleton at 17:35 on 01/29/2018.  This report was finalized on 1/30/2018 6:44 AM by Dr. Arnie Parson MD.      Ct Upper Extremity Left With Contrast    Result Date: 1/30/2018  Narrative: CT UPPER EXTREMITY LEFT W CONTRAST-: 1/30/2018 2:13 PM  INDICATION:  Open wound overlying the olecranon. Osteomyelitis.  TECHNIQUE: CT of the left elbow with contrast. Coronal and sagittal reconstructions were obtained.  Radiation dose reduction techniques included automated exposure control or exposure modulation based on body size. Radiation audit for number of CT and nuclear cardiology exams performed in the last year: 0.   COMPARISON:  Left elbow radiograph 01/22/2018.  FINDINGS: Focal phlegmonous collection  overlying the olecranon measures 7 x 1.3 x 5 cm. No discrete drainable abscess is identified. There is an open soft tissue wound.. This abuts the posterior margin of the ulna. No osseous erosions are identified in the olecranon. No elbow effusion. Articulation at the elbow is normal.  Superficial veins at the elbow are patent.      Impression:  1. Superficial phlegmonous collection posterior to the elbow measuring up to 7 cm. No discrete drainable fluid collections. 2. No osseous erosions to indicate osteomyelitis. 3. No elbow effusion.  This report was finalized on 1/30/2018 2:59 PM by Dr. Arnie Parson MD.      Allergies:  is allergic to lisinopril.    Gil  Gabapentin/oxycodone/temazepam 1/2018 per report of 1/27/18    Discharge Medications:   WinstonWiley   Home Medication Instructions CHERELLE:[id number]    Printed on:02/02/18 0741   Medication Information                      B Complex Vitamins (VITAMIN B COMPLEX) tablet  Take 100 mg by mouth Daily.             folic acid (FOLVITE) 1 MG tablet  Take 1 mg by mouth Daily.             furosemide (LASIX) 40 MG tablet  Take 1 tablet by mouth 2 (Two) Times a Day.             gabapentin (NEURONTIN) 600 MG tablet  Take 600 mg by mouth Every Night.             lactulose 20 GM/30ML solution solution  Take 30 mL by mouth 4 (Four) Times a Day.             pantoprazole (PROTONIX) 40 MG EC tablet  Take 40 mg by mouth Daily.             Prenatal Vit-Fe Fumarate-FA (PRENATAL, CLASSIC, VITAMIN) 28-0.8 MG tablet tablet  Take  by mouth Daily.             rifAXIMin (XIFAXAN) 550 MG tablet  Take 1 tablet by mouth Every 12 (Twelve) Hours.             spironolactone (ALDACTONE) 100 MG tablet  Take 2 tablets by mouth Daily.             tamsulosin (FLOMAX) 0.4 MG capsule 24 hr capsule  Take 1 capsule by mouth Every Night.             temazepam (RESTORIL) 15 MG capsule  Take 15 mg by mouth At Night As Needed for Sleep.             thiamine (VITAMIN B-1) 100 MG tablet  Take 100 mg by  "mouth Daily.             traZODone (DESYREL) 50 MG tablet  Take 50 mg by mouth Every Night.             zinc sulfate (ZINCATE) 220 (50 Zn) MG capsule  Take 1 capsule by mouth 2 (Two) Times a Day.               History of Present Illness: Taken from [facility] on admit:   \"As per Dr Pendleton's ED Note:  Narrative: The patient is a 53-year-old white male who presents as noted above.  The patient is currently being treated with antibiotics for an open left elbow wound.  This was suffered in November and the patient has had multiple rounds of antibiotics, including IV antibiotics, and is currently on clindamycin.  Diarrhea began 3 days ago and the patient is having approximately 10 stools per day.  Patient has been able to drink some fluids, but unable to eat any significant solid foods.  No fever.  The patient is now too weak to ambulate and the wife states that the patient does have periods where he says things that make sense and appears confused.      Patient presented to Ed with diarrhea and weakness. His speech is slow and unclear at times. Injury to Lt elbow occurred 3 months ago. He has HTN and GERD but does not have any hx of diabetes. Patient denies any sx of fever, headache, chest pain, shortness of breath, abdominal pain, nausea/ vomiting, dysuria, urgency/ frequency or any recent hx of blood in stool. No other medical history available.\"    Hospital Course  1. Septic Bursitis of left elbow: ortho/surgery following  Elbow injury occurred 3-4 months ago with a chronic open wound treated with multiple antibiotics/dressing changes without healing.   Previously scheduled to see Dr Khalil outpatient, plan f/u appointment outpatient  Wound culture no growth at 3 days final result, off antibiotics x 2 days (initially on Vancomycin/Zosyn)  BC x 2 NG <24 hours  D/W Dr. Sellers, antibiotics discontinued yesterday as cultures are negative  Recommend continued wound care, packing with iodoform and dressing changes daily with " close monitoring by wound care providers at [facility]  F/U Dr. Khalil 1 week  F/U Dr. Sellers 3 weeks  F/U Debi Dodson MD 1 week after discharge from facility     2. Alcoholic Cirrhosis/Hepatitis/ESLD:  3. Anasarca/Ascites/hyponatremia: GI following  4. 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 Caceres  -Plan is to consult [facility] to discuss possible end of life care options  -Transfer to [facility] today for continued rehabilitation  -Obtain outpatient appointment with [facility] Liver Transplant team to evaluate option for transplant  New patient appointment [facility] Liver transplant asa  F/U Dr. Caceres 2 weeks      5. Diarrhea: improved, C. Difficile negative, on lactulose with loose stools as expected      6. Electrolyte imbalance:   Received electrolyte replacement protocols for potassium, phosphorus and magnesium while inpatient  Monitor at intervals, stable      7. Chronic anemia, thrombocytopenia, coagulopathy:  Secondary to ESLD and chronic alcoholic hepatitis  Continues on folic acid/thiamine  Hemoglobin stable at 8.5, without active blood loss  Monitor at intervals       8. Hypertension: remained at goal on medications as above      9. BPH: no acute issues on Flomax      10. GERD: no acute issues on Protonix      11.  Tobacco abuse: no acute issues      12. Chronic back pain:   PT/OT ongoing  Continued on Neurontin 600 mg nightly with good effect    13. ANDREW: home CPAP without issues    14. Obesity: Body mass index is 38.27 kg/(m^2).    Last Lab Results:   Lab Results (most recent)     Procedure Component Value Units Date/Time    Protime-INR [[id number]]  (Abnormal) Collected:  01/27/18 0839    Specimen:  Blood Updated:  01/27/18 0856     Protime 24.4 (H) Seconds      INR 2.16 (H)    Narrative:       Therapeutic Ranges for INR: 2.0-3.0 (PT 20-30)                              2.5-3.5 (PT 25-34)    aPTT [[id number]]  (Abnormal) Collected:  01/27/18 0839    Specimen:  Blood Updated:  01/27/18 0856     PTT 43.4 (H) seconds     Narrative:       PTT = The equivalent PTT values for the therapeutic range of heparin levels at 0.1 to 0.7 U/ml are 53 to 110 seconds.    CBC Auto Differential [[id number]]  (Abnormal) Collected:  01/27/18 0839    Specimen:  Blood Updated:  01/27/18 0857     WBC 6.86 10*3/mm3      RBC 2.97 (L) 10*6/mm3      Hemoglobin 9.7 (L) g/dL      Hematocrit 26.9 (L) %      MCV 90.6 fL      MCH 32.7 (H) pg      MCHC 36.1 g/dL      RDW 19.0 (H) %      RDW-SD 63.6 (H) fl      MPV 9.5 fL      Platelets 76 (L) 10*3/mm3      Neutrophil % 45.2 %      Lymphocyte % 31.3 %      Monocyte % 20.4 (H) %      Eosinophil % 2.3 %      Basophil % 0.4 %      Immature Grans % 0.4 %      Neutrophils, Absolute 3.09 10*3/mm3      Lymphocytes, Absolute 2.15 10*3/mm3      Monocytes, Absolute 1.40 (H) 10*3/mm3      Eosinophils, Absolute 0.16 10*3/mm3      Basophils, Absolute 0.03 10*3/mm3      Immature Grans, Absolute 0.03 10*3/mm3      nRBC 0.0 /100 WBC     Ammonia [[id number]]  (Normal) Collected:  01/27/18 0839    Specimen:  Blood Updated:  01/27/18 0902     Ammonia 60 umol/L     Comprehensive Metabolic Panel [[id number]]  (Abnormal) Collected:  01/27/18 0839    Specimen:  Blood Updated:  01/27/18 0903     Glucose 134 (H) mg/dL      BUN 15 mg/dL       Creatinine 1.19 mg/dL      Sodium 125 (L) mmol/L      Potassium 2.5 (L) mmol/L      Chloride 90 (L) mmol/L      CO2 23.8 mmol/L      Calcium 7.6 (L) mg/dL      Total Protein 6.1 g/dL      Albumin 2.20 (L) g/dL      ALT (SGPT) 29 U/L      AST (SGOT) 61 (H) U/L      Alkaline Phosphatase 207 (H) U/L      Total Bilirubin 5.0 (H) mg/dL      eGFR Non African Amer 64 mL/min/1.73      Globulin 3.9 gm/dL      A/G Ratio 0.6 g/dL      BUN/Creatinine Ratio 12.6     Anion Gap 11.2 mmol/L     Lactic Acid, Plasma [[id number]]  (Abnormal) Collected:  01/27/18 0839    Specimen:  Blood Updated:  01/27/18 0905     Lactate 2.1 (C) mmol/L     CBC & Differential [[id number]] Collected:  01/27/18 0839    Specimen:  Blood Updated:  01/27/18 0930    Narrative:       The following orders were created for panel order CBC & Differential.  Procedure                               Abnormality         Status                     ---------                               -----------         ------                     Scan Slide[[id number]]                                       Final result               CBC Auto Differential[[id number]]        Abnormal            Final result                 Please view results for these tests on the individual orders.    Scan Slide [[id number]] Collected:  01/27/18 0839    Specimen:  Blood Updated:  01/27/18 0930     Anisocytosis Slight/1+     Hypochromia Slight/1+     Macrocytes Slight/1+     Target Cells Slight/1+     WBC Morphology Normal     Platelet Estimate Decreased     Large Platelets --      OCC        Urinalysis With / Culture If Indicated - Urine, Catheter [[id number]]  (Abnormal) Collected:  01/27/18 0934    Specimen:  Urine from Urine, Clean Catch Updated:  01/27/18 0954     Color, UA Other (A)     Appearance, UA Clear     pH, UA 6.0     Specific Gravity, UA 1.010     Glucose, UA Negative     Ketones, UA Negative     Bilirubin, UA Small (1+) (A)     Blood, UA Negative     Protein, UA Negative     Leuk  Esterase, UA Negative     Nitrite, UA Negative     Urobilinogen, UA 0.2 E.U./dL    Narrative:       Urine microscopic not indicated.    Influenza Antigen, Rapid - Swab, Nasopharynx [[id number]]  (Normal) Collected:  01/27/18 1054    Specimen:  Swab from Nasopharynx Updated:  01/27/18 1123     Influenza A Ag, EIA Negative     Influenza B Ag, EIA Negative    Clostridium Difficile Toxin - Stool, Per Rectum [[id number]] Collected:  01/27/18 1054    Specimen:  Stool from Per Rectum Updated:  01/27/18 1212    Narrative:       The following orders were created for panel order Clostridium Difficile Toxin - Stool, Per Rectum.  Procedure                               Abnormality         Status                     ---------                               -----------         ------                     Clostridium Difficile EI...[[id number]]  Normal              Final result                 Please view results for these tests on the individual orders.    Clostridium Difficile EIA - Stool, Per Rectum [[id number]]  (Normal) Collected:  01/27/18 1054    Specimen:  Stool from Per Rectum Updated:  01/27/18 1212     C Diff GDH / Toxin Negative    Lactic Acid, Reflex Timer (This will reflex a repeat order 3-3:15 hours after ordered.) [[id number]] Collected:  01/27/18 0839    Specimen:  Blood Updated:  01/27/18 1216     Extra Tube Hold for add-ons.      Auto resulted.       Sedimentation Rate [[id number]]  (Abnormal) Collected:  01/27/18 1213    Specimen:  Blood Updated:  01/27/18 1224     Sed Rate 55 (H) mm/hr     Lactic Acid, Reflex [[id number]]  (Normal) Collected:  01/27/18 1230    Specimen:  Blood Updated:  01/27/18 1254     Lactate 1.6 mmol/L     Basic Metabolic Panel [[id number]]  (Abnormal) Collected:  01/27/18 2030    Specimen:  Blood Updated:  01/27/18 2053     Glucose 174 (H) mg/dL      BUN 17 mg/dL      Creatinine 1.43 (H) mg/dL      Sodium 126 (L) mmol/L      Potassium 2.7 (L) mmol/L      Chloride 91 (L) mmol/L      CO2 24.2 mmol/L       Calcium 7.7 (L) mg/dL      eGFR Non African Amer 52 (L) mL/min/1.73      BUN/Creatinine Ratio 11.9     Anion Gap 10.8 mmol/L     Narrative:       GFR Normal >60  Chronic Kidney Disease <60  Kidney Failure <15    C-reactive Protein [[id number]]  (Abnormal) Collected:  01/27/18 1741    Specimen:  Blood Updated:  01/28/18 0819     C-Reactive Protein 2.86 (H) mg/dL     CBC Auto Differential [[id number]]  (Abnormal) Collected:  01/28/18 1341    Specimen:  Blood Updated:  01/28/18 1346     WBC 8.57 10*3/mm3      RBC 2.96 (L) 10*6/mm3      Hemoglobin 9.7 (L) g/dL      Hematocrit 27.1 (L) %      MCV 91.6 fL      MCH 32.8 (H) pg      MCHC 35.8 g/dL      RDW 18.8 (H) %      RDW-SD 62.3 (H) fl      MPV 10.2 fL      Platelets 75 (L) 10*3/mm3      Neutrophil % 49.6 %      Lymphocyte % 33.3 %      Monocyte % 13.9 (H) %      Eosinophil % 1.9 %      Basophil % 0.6 %      Immature Grans % 0.7 (H) %      Neutrophils, Absolute 4.26 10*3/mm3      Lymphocytes, Absolute 2.85 10*3/mm3      Monocytes, Absolute 1.19 (H) 10*3/mm3      Eosinophils, Absolute 0.16 10*3/mm3      Basophils, Absolute 0.05 10*3/mm3      Immature Grans, Absolute 0.06 (H) 10*3/mm3      nRBC 0.0 /100 WBC     Phosphorus [[id number]]  (Abnormal) Collected:  01/28/18 1341    Specimen:  Blood Updated:  01/28/18 1402     Phosphorus 2.5 (L) mg/dL     Magnesium [[id number]]  (Normal) Collected:  01/28/18 1341    Specimen:  Blood Updated:  01/28/18 1402     Magnesium 1.7 mg/dL     Comprehensive Metabolic Panel [[id number]]  (Abnormal) Collected:  01/28/18 1341    Specimen:  Blood Updated:  01/28/18 1402     Glucose 158 (H) mg/dL      BUN 18 mg/dL      Creatinine 1.44 (H) mg/dL      Sodium 127 (L) mmol/L      Potassium 3.4 (L) mmol/L      Chloride 92 (L) mmol/L      CO2 22.3 mmol/L      Calcium 7.7 (L) mg/dL      Total Protein 6.0 g/dL      Albumin 2.10 (L) g/dL      ALT (SGPT) 29 U/L      AST (SGOT) 60 (H) U/L      Alkaline Phosphatase 215 (H) U/L      Total Bilirubin 5.0 (H)  mg/dL      eGFR Non African Amer 52 (L) mL/min/1.73      Globulin 3.9 gm/dL      A/G Ratio 0.5 g/dL      BUN/Creatinine Ratio 12.5     Anion Gap 12.7 mmol/L     Phosphorus [[id number]]  (Abnormal) Collected:  01/29/18 0820    Specimen:  Blood Updated:  01/29/18 0844     Phosphorus 2.4 (L) mg/dL     Magnesium [[id number]]  (Normal) Collected:  01/29/18 0820    Specimen:  Blood Updated:  01/29/18 0844     Magnesium 1.7 mg/dL     Potassium [[id number]]  (Normal) Collected:  01/29/18 0820    Specimen:  Blood Updated:  01/29/18 0844     Potassium 4.0 mmol/L     Phosphorus [[id number]]  (Normal) Collected:  01/30/18 0015    Specimen:  Blood Updated:  01/30/18 0055     Phosphorus 2.7 mg/dL     Vancomycin, Random [[id number]]  (Normal) Collected:  01/30/18 0015    Specimen:  Blood Updated:  01/30/18 0055     Vancomycin Random 6.30 mcg/mL     CBC & Differential [[id number]] Collected:  01/30/18 0532    Specimen:  Blood Updated:  01/30/18 0555    Narrative:       The following orders were created for panel order CBC & Differential.  Procedure                               Abnormality         Status                     ---------                               -----------         ------                     CBC Auto Differential[[id number]]        Abnormal            Final result                 Please view results for these tests on the individual orders.    CBC Auto Differential [[id number]]  (Abnormal) Collected:  01/30/18 0532    Specimen:  Blood Updated:  01/30/18 0555     WBC 8.80 10*3/mm3      RBC 2.81 (L) 10*6/mm3      Hemoglobin 9.1 (L) g/dL      Hematocrit 25.5 (L) %      MCV 90.7 fL      MCH 32.4 (H) pg      MCHC 35.7 g/dL      RDW 19.1 (H) %      RDW-SD 63.4 (H) fl      MPV 9.8 fL      Platelets 77 (L) 10*3/mm3      Neutrophil % 43.0 (L) %      Lymphocyte % 40.8 %      Monocyte % 13.1 (H) %      Eosinophil % 1.8 %      Basophil % 0.7 %      Immature Grans % 0.6 (H) %      Neutrophils, Absolute 3.79 10*3/mm3      Lymphocytes,  Absolute 3.59 10*3/mm3      Monocytes, Absolute 1.15 (H) 10*3/mm3      Eosinophils, Absolute 0.16 10*3/mm3      Basophils, Absolute 0.06 10*3/mm3      Immature Grans, Absolute 0.05 (H) 10*3/mm3      nRBC 0.0 /100 WBC     Ammonia [[id number]]  (Abnormal) Collected:  01/30/18 0532    Specimen:  Blood Updated:  01/30/18 0604     Ammonia 76 (H) umol/L     Magnesium [[id number]]  (Normal) Collected:  01/30/18 0532    Specimen:  Blood Updated:  01/30/18 0616     Magnesium 1.9 mg/dL     Basic Metabolic Panel [[id number]]  (Abnormal) Collected:  01/30/18 0532    Specimen:  Blood Updated:  01/30/18 0624     Glucose 101 (H) mg/dL      BUN 17 mg/dL      Creatinine 1.20 mg/dL      Sodium 131 (L) mmol/L      Potassium 4.0 mmol/L      Chloride 98 mmol/L      CO2 22.2 mmol/L      Calcium 7.5 (L) mg/dL      eGFR Non African Amer 64 mL/min/1.73      BUN/Creatinine Ratio 14.2     Anion Gap 10.8 mmol/L     Narrative:       GFR Normal >60  Chronic Kidney Disease <60  Kidney Failure <15    Potassium, Urine, Random - Urine, Clean Catch [[id number]] Collected:  01/31/18 1614    Specimen:  Urine from Urine, Clean Catch Updated:  01/31/18 1641     Potassium, Urine 22.1 mmol/L     Sodium, Urine, Random - Urine, Clean Catch [[id number]] Collected:  01/31/18 1614    Specimen:  Urine from Urine, Clean Catch Updated:  01/31/18 1641     Sodium, Urine 34 mmol/L     Vancomycin, Random [[id number]]  (Normal) Collected:  01/31/18 1652    Specimen:  Blood Updated:  01/31/18 1739     Vancomycin Random 27.90 mcg/mL     Protime-INR [[id number]]  (Abnormal) Collected:  02/01/18 0354    Specimen:  Blood Updated:  02/01/18 0422     Protime 25.0 (H) Seconds      INR 2.22 (H)    Narrative:       Therapeutic Ranges for INR: 2.0-3.0 (PT 20-30)                              2.5-3.5 (PT 25-34)    Comprehensive Metabolic Panel [[id number]]  (Abnormal) Collected:  02/01/18 0354    Specimen:  Blood Updated:  02/01/18 0438     Glucose 112 (H) mg/dL      BUN 13 mg/dL       Creatinine 1.15 mg/dL      Sodium 130 (L) mmol/L      Potassium 3.6 mmol/L      Chloride 97 (L) mmol/L      CO2 23.5 mmol/L      Calcium 7.6 (L) mg/dL      Total Protein 5.7 (L) g/dL      Albumin 2.00 (L) g/dL      ALT (SGPT) 32 U/L      AST (SGOT) 61 (H) U/L      Alkaline Phosphatase 177 (H) U/L      Total Bilirubin 3.9 (H) mg/dL      eGFR Non African Amer 67 mL/min/1.73      Globulin 3.7 gm/dL      A/G Ratio 0.5 g/dL      BUN/Creatinine Ratio 11.3     Anion Gap 9.5 mmol/L     Ammonia [[id number]]  (Abnormal) Collected:  02/01/18 0354    Specimen:  Blood Updated:  02/01/18 0439     Ammonia 91 (H) umol/L     CBC Auto Differential [[id number]]  (Abnormal) Collected:  02/01/18 0354    Specimen:  Blood Updated:  02/01/18 0556     WBC 7.74 10*3/mm3      RBC 2.67 (L) 10*6/mm3      Hemoglobin 8.8 (L) g/dL      Hematocrit 24.6 (L) %      MCV 92.1 fL      MCH 33.0 (H) pg      MCHC 35.8 g/dL      RDW 19.0 (H) %      RDW-SD 63.4 (H) fl      MPV 10.3 fL      Platelets 57 (L) 10*3/mm3      Neutrophil % 49.1 %      Lymphocyte % 33.1 %      Monocyte % 13.8 (H) %      Eosinophil % 2.8 %      Basophil % 0.6 %      Immature Grans % 0.6 (H) %      Neutrophils, Absolute 3.79 10*3/mm3      Lymphocytes, Absolute 2.56 10*3/mm3      Monocytes, Absolute 1.07 (H) 10*3/mm3      Eosinophils, Absolute 0.22 10*3/mm3      Basophils, Absolute 0.05 10*3/mm3      Immature Grans, Absolute 0.05 (H) 10*3/mm3      nRBC 0.3 (H) /100 WBC     CBC & Differential [[id number]] Collected:  02/01/18 0354    Specimen:  Blood Updated:  02/01/18 0627    Narrative:       The following orders were created for panel order CBC & Differential.  Procedure                               Abnormality         Status                     ---------                               -----------         ------                     Scan Slide[[id number]]                                       Final result               CBC Auto Differential[[id number]]        Abnormal            Final  result                 Please view results for these tests on the individual orders.    Scan Slide [[id number]] Collected:  02/01/18 0354    Specimen:  Blood Updated:  02/01/18 0627     Poikilocytes Slight/1+     Target Cells Slight/1+     WBC Morphology Normal     Platelet Morphology Normal    Wound Culture - Surgical Site, Arm, Left [[id number]]  (Normal) Collected:  01/29/18 0104    Specimen:  Surgical Site from Arm, Left Updated:  02/01/18 0727     Wound Culture No growth at 3 days     Gram Stain Result Few (2+) WBCs seen      No organisms seen    AFP Tumor Marker [[id number]] Collected:  01/31/18 0605    Specimen:  Blood Updated:  02/01/18 0822     AFP Tumor Marker 4.7 ng/mL       [facility] ECLIA methodology       Narrative:       Performed at:  [address] [place] [address]  [id number]  : Jayden Alba PhD, Phone:  [phone]    Blood Culture - Blood, [[id number]]  (Normal) Collected:  02/01/18 0840    Specimen:  Blood from Blood, Central Line Updated:  02/01/18 2046     Blood Culture No growth at less than 24 hours    Blood Culture - Blood, [[id number]]  (Normal) Collected:  02/01/18 0930    Specimen:  Blood from Hand, Left Updated:  02/01/18 2146     Blood Culture No growth at less than 24 hours    Vancomycin, Random [[id number]]  (Normal) Collected:  02/02/18 0439    Specimen:  Blood Updated:  02/02/18 0539     Vancomycin Random 17.90 mcg/mL     Comprehensive Metabolic Panel [[id number]]  (Abnormal) Collected:  02/02/18 0439    Specimen:  Blood Updated:  02/02/18 0540     Glucose 99 mg/dL      BUN 11 mg/dL      Creatinine 0.92 mg/dL      Sodium 133 (L) mmol/L      Potassium 3.9 mmol/L      Chloride 101 mmol/L      CO2 26.6 mmol/L      Calcium 7.8 (L) mg/dL      Total Protein 5.8 (L) g/dL      Albumin 2.00 (L) g/dL      ALT (SGPT) 32 U/L      AST (SGOT) 62 (H) U/L      Alkaline Phosphatase 168 (H) U/L      Total Bilirubin 3.8 (H) mg/dL      eGFR Non African Amer 86 mL/min/1.73       Globulin 3.8 gm/dL      A/G Ratio 0.5 g/dL      BUN/Creatinine Ratio 12.0     Anion Gap 5.4 mmol/L     CBC & Differential [[id number]] Collected:  02/02/18 0439    Specimen:  Blood Updated:  02/02/18 0547    Narrative:       The following orders were created for panel order CBC & Differential.  Procedure                               Abnormality         Status                     ---------                               -----------         ------                     Scan Slide[[id number]]                                                                  CBC Auto Differential[[id number]]        Abnormal            Final result                 Please view results for these tests on the individual orders.    CBC Auto Differential [[id number]]  (Abnormal) Collected:  02/02/18 0439    Specimen:  Blood Updated:  02/02/18 0547     WBC 6.74 10*3/mm3      RBC 2.56 (L) 10*6/mm3      Hemoglobin 8.5 (L) g/dL      Hematocrit 23.7 (C) %      MCV 92.6 fL      MCH 33.2 (H) pg      MCHC 35.9 g/dL      RDW 19.1 (H) %      RDW-SD 64.8 (H) fl      MPV 9.9 fL      Platelets 52 (L) 10*3/mm3      Neutrophil % 41.4 (L) %      Lymphocyte % 37.1 %      Monocyte % 18.0 (H) %      Eosinophil % 2.2 %      Basophil % 0.7 %      Immature Grans % 0.6 (H) %      Neutrophils, Absolute 2.79 10*3/mm3      Lymphocytes, Absolute 2.50 10*3/mm3      Monocytes, Absolute 1.21 (H) 10*3/mm3      Eosinophils, Absolute 0.15 10*3/mm3      Basophils, Absolute 0.05 10*3/mm3      Immature Grans, Absolute 0.04 (H) 10*3/mm3      nRBC 0.0 /100 WBC     Protime-INR [[id number]]  (Abnormal) Collected:  02/02/18 0439    Specimen:  Blood Updated:  02/02/18 0553     Protime 25.8 (H) Seconds      INR 2.31 (H)    Narrative:       Therapeutic Ranges for INR: 2.0-3.0 (PT 20-30)                              2.5-3.5 (PT 25-34)    Ammonia [[id number]]  (Abnormal) Collected:  02/02/18 0438    Specimen:  Blood Updated:  02/02/18 0600     Ammonia 64 (H) umol/L     Procalcitonin [[id number]]   (Normal) Collected:  02/02/18 0439    Specimen:  Blood Updated:  02/02/18 0619     Procalcitonin 0.18 ng/mL     Narrative:       As a Marker for Sepsis (Non-Neonates):   1. <0.5 ng/mL represents a low risk of severe sepsis and/or septic shock.  2. >2 ng/mL represents a high risk of severe sepsis and/or septic shock.    As a Marker for Lower Respiratory Tract Infections that require antibiotic therapy:    PCT on Admission     Antibiotic Therapy       6-12 Hrs later  > 0.5                Strongly Recommended             >0.25 - <0.5         Recommended  0.1 - 0.25           Discouraged              Remeasure/reassess PCT  <0.1                 Strongly Discouraged     Remeasure/reassess PCT                     PCT values of < 0.5 ng/mL do not exclude an infection, because localized infections (without systemic signs) may be associated with such low concentrations, or a systemic infection in its initial stages (< 6 hours). Furthermore, increased PCT can occur without infection. PCT concentrations between 0.5 and 2.0 ng/mL should be interpreted taking into account the patient's history. It is recommended to retest PCT within 6-24 hours if any concentrations < 2 ng/mL are obtained.        Imaging Results (most recent)     Procedure Component Value Units Date/Time    XR Chest 1 View [[id number]] Collected:  01/30/18 0643     Updated:  01/30/18 0646    Narrative:       INDICATION:   PICC placement      COMPARISON:    12/16/2016     FINDINGS:  Single portable AP view of the chest.       Right PICC terminates over the cavoatrial junction.     Heart and mediastinal contours are normal. The lungs are clear. No  pneumothorax or pleural effusion.       Impression:       Right PICC terminates over the cavoatrial junction.        Initial interpretation provided by Dr. Isidro Singleton at 17:35 on  01/29/2018.     This report was finalized on 1/30/2018 6:44 AM by Dr. Arnie Parson MD.       US Abdomen Complete [[id number]] Collected:   01/30/18 1132     Updated:  01/30/18 1137    Narrative:       US ABDOMEN COMPLETE-: 1/30/2018 10:04 AM     INDICATION:   Cirrhosis. Ascites. Confusion     COMPARISON:   Right upper quadrant ultrasound 08/06/2013     FINDINGS:  PANCREAS: The pancreas is obscured due to overlying bowel gas.     LIVER: Coarsened echotexture of the hepatic parenchyma is indicative of  background cirrhosis.. No hepatic mass. The intrahepatic bile ducts are  normal in caliber. The common duct is not visualized at the glenna  hepatis.     GALLBLADDER: The gallbladder is normal. No gallstones.     KIDNEYS: The right kidney measures 11.2 cm. The left kidney measures  11.8 cm.  Renal cortical thickness and echogenicity is normal. No  hydronephrosis.     SPLEEN: The spleen is normal in size.       OTHER: The abdominal aorta is normal in size.  The juxtahepatic IVC is  within normal limits. Moderate volume of ascites.       Impression:          1. Cirrhotic morphology to liver.  2. Moderate ascites.     This report was finalized on 1/30/2018 11:35 AM by Dr. Arnie Parson MD.       CT Upper Extremity Left With Contrast [[id number]] Collected:  01/30/18 1455     Updated:  01/30/18 1501    Narrative:       CT UPPER EXTREMITY LEFT W CONTRAST-: 1/30/2018 2:13 PM     INDICATION:    Open wound overlying the olecranon. Osteomyelitis.     TECHNIQUE:   CT of the left elbow with contrast. Coronal and sagittal reconstructions  were obtained.  Radiation dose reduction techniques included automated  exposure control or exposure modulation based on body size. Radiation  audit for number of CT and nuclear cardiology exams performed in the  last year: 0.       COMPARISON:    Left elbow radiograph 01/22/2018.     FINDINGS:  Focal phlegmonous collection overlying the olecranon measures 7 x 1.3 x  5 cm. No discrete drainable abscess is identified. There is an open soft  tissue wound.. This abuts the posterior margin of the ulna. No osseous  erosions are identified  in the olecranon. No elbow effusion.  Articulation at the elbow is normal.     Superficial veins at the elbow are patent.       Impression:          1. Superficial phlegmonous collection posterior to the elbow measuring  up to 7 cm. No discrete drainable fluid collections.  2. No osseous erosions to indicate osteomyelitis.  3. No elbow effusion.     This report was finalized on 2018 2:59 PM by Dr. Arnie Parson MD.           PROCEDURES: NONE    Condition on Discharge:  stable    Physical Exam at Discharge  Vital Signs  Temp:  [97.3 °F (36.3 °C)-97.7 °F (36.5 °C)] 97.6 °F (36.4 °C)  Heart Rate:  [] 95  Resp:  [18-20] 20  BP: (114-149)/(64-92) 137/69    Physical Exam:  Physical Exam   Constitutional: Patient appears well-developed and well-nourished and in no acute distress, obese, jaundiced   HEENT:   Head: Normocephalic and atraumatic.   Eyes:  Pupils are equal, round, and reactive to light. EOM are intact. Sclera are anicteric and non-injected.  Mouth and Throat: Patient has moist mucous membranes. Oropharynx is clear of any erythema or exudate.     Neck: Neck supple. No JVD present. No thyromegaly present. No lymphadenopathy present.  Cardiovascular: Regular rate, regular rhythm, S1 normal and S2 normal.  Exam reveals no gallop and no friction rub.  No murmur heard.  Pulmonary/Chest: Lungs clear but diminished  Abdominal: Distended, obese, Soft. Bowel sounds are normal. There is no tenderness.   Musculoskeletal: unable to determine secondary to habitus  Extremities: 3+ pitting LE edema. Pulses are palpable in all 4 extremities.  Neurological: Patient is alert and oriented to person, place, . Cranial nerves II-XII are grossly intact with no focal deficits.  Skin: Left elbow with dressing clean, dry and intact. Skin is warm. No rash noted. Nails show no clubbing.  No cyanosis or erythema.    Discharge Disposition  Signature of [facility]    Visiting Nurse:  24 hour supervision recommended by  PT/OT  As per facility    Home PT/OT:  As per facility    Home Safety Evaluation:  As per facility    DME  Will need rolling walker, bedside commode    Discharge Diet:           Dietary Orders            Start     Ordered    01/31/18 1229  Diet Regular; Cardiac, Consistent Carbohydrate, Low Sodium; 2,000 mg Na  Diet Effective Now     Comments:  1500 ml/day fluid restriction   Question Answer Comment   Diet Texture / Consistency Regular    Common Modifiers Cardiac    Common Modifiers Consistent Carbohydrate    Common Modifiers Low Sodium    Low Sodium Restriction: 2,000 mg Na        01/31/18 1229        Activity at Discharge:  As tolerated    Pre-discharge education  Medications, dietary restrictions, follow up    Follow-up Appointments  No future appointments.  Additional Instructions for the Follow-ups that You Need to Schedule     Discharge Follow-up with PCP    As directed    Follow Up Details:  1 week after discharge from facility           Discharge Follow-up with Specialty: Dr. Sellers; 3 Weeks    As directed    Specialty:  Dr. Sellers    Follow Up:  3 Weeks           Discharge Follow-up with Specified Provider: Dr. Caceres; 2 Weeks    As directed    To:  Dr. Caceres    Follow Up:  2 Weeks           Discharge Follow-up with Specified Provider: Dr. Khalil; 1 Week    As directed    To:  Dr. Khalil    Follow Up:  1 Week           Discharge Follow-up with Specified Provider: [facility]    As directed    To:  [facility]    Follow Up Details:  ASAP                     Test Results Pending at Discharge: will need f/u by SNF provider   Order Current Status    Blood Culture - Blood, Preliminary result    Blood Culture - Blood, Preliminary result           Dorina Hudson, GARY  02/02/18  7:41 AM    Time: Discharge over 30 min (if over 30 minutes give explanation as to why it took greater than 30 minutes)  Secondary to:  Coordination of care/follow up  Medication  reconciliation  Extensive discussion with patient and significant other

## 2018-02-05 NOTE — PAYOR COMM NOTE
"Wiley Peterson (53 y.o. Male)     ATTN: SAMUEL NAVARRO  AUTH#[id number]  FAXING DISCHARGE INFO. THANK YOU.       Date of Birth Social Security Number Address Home Phone MRN    1965  [id number] [facility] [address] [phone] [id number]    [employer]oodoo Marital Status          None Single       Admission Date Admission Type Admitting Provider Attending Provider Department, Room/Bed    1/27/18 Emergency Ginny Lynn MD  [facility] MED SURG, 1415/1    Discharge Date Discharge Disposition Discharge Destination        2/2/2018 Skilled Nursing Facility (DC - External)             Attending Provider: (none)    Allergies:  Lisinopril    Isolation:  None   Infection:  None   Code Status:  Prior    Ht:  188 cm (74\")   Wt:  135 kg (298 lb 1 oz)    Admission Cmt:  None   Principal Problem:  None                Active Insurance as of 1/27/2018     Primary Coverage     Payor Plan Insurance Group Employer/Plan Group    PAS[employer]ORT PAS[employer]ORT MEDICAID     Payor Plan Address Payor Plan Phone Number Effective From Effective To    [address] [phone] 12/1/2014     [place] [address]       Subscriber Name Subscriber Birth Date Member ID       WILEY PETERSON 1965 [id number]                 Emergency Contacts      (Rel.) Home Phone Work Phone Mobile Phone    Courtney Blank (Significant Other) [phone] -- --               Discharge Summary      GARY Mosquera at 2/2/2018  6:50 AM     Attestation signed by Ginny Lynn MD at 2/2/2018 12:52 PM        I have personally seen and examined the patient and reviewed nurse practitioners discharge summary. I agree with her diagnoses and treatments. [facility] to meet with patient at SNF.                               Wiley Peterson  1965  [id number]    Hospitalists Discharge Summary    Date of Admission: 1/27/2018  Date of Discharge:  2/2/2018    Primary Discharge Diagnoses:   1. Septic Bursitis of left " elbow   2. Alcoholic Cirrhosis/Hepatitis/ESLD:  3. Anasarca/Ascites/hyponatremia: GI following  4. Hepatic encephalopathy/moderate cognitive deficiencies    5. Diarrhea  Secondary Discharge Diagnoses:     6. Electrolyte imbalance    7. Chronic anemia, thrombocytopenia, coagulopathy    8. Hypertension    9. BPH    10. GERD    11. Tobacco abuse  12. Chronic back pain  13. ANDREW  14. Obesity  PCP  Patient Care Team:  Debi Dodson MD as PCP - General (Family Medicine)    Consults:   Consults     Date and Time Order Name Status Description    1/30/2018 1635 Inpatient Consult to Gastroenterology Completed     1/29/2018 2059 Inpatient Consult to Gastroenterology      1/27/2018 1159 Inpatient Consult to Orthopedic Surgery Completed     1/27/2018 1055 Inpatient Consult to General Surgery Completed         Operations and Procedures Performed:     Xr Elbow 3+ View Left    Result Date: 1/22/2018  Narrative: INDICATION: Fall 3 months ago. Open wound the left elbow. Left elbow pain..  COMPARISON: None available.  FINDINGS: 3 views of the left elbow.  No fracture, dislocation, or effusion. There is some subcutaneous gas underlying a left elbow laceration. Patient has reported history of an open wound. No osseous erosions.  No foreign body.      Impression: Small volume subcutaneous gas associated with the superficial wound/laceration. The gas is fairly localized and does not track into the adjacent soft tissues. Correlate for any evidence of abscess.  This report was finalized on 1/22/2018 12:01 PM by Dr. Arnie Parson MD.      Us Abdomen Complete    Result Date: 1/30/2018  Narrative: US ABDOMEN COMPLETE-: 1/30/2018 10:04 AM  INDICATION: Cirrhosis. Ascites. Confusion  COMPARISON: Right upper quadrant ultrasound 08/06/2013  FINDINGS: PANCREAS: The pancreas is obscured due to overlying bowel gas.  LIVER: Coarsened echotexture of the hepatic parenchyma is indicative of background cirrhosis.. No hepatic mass. The intrahepatic  bile ducts are normal in caliber. The common duct is not visualized at the glenna hepatis.  GALLBLADDER: The gallbladder is normal. No gallstones.  KIDNEYS: The right kidney measures 11.2 cm. The left kidney measures 11.8 cm.  Renal cortical thickness and echogenicity is normal. No hydronephrosis.  SPLEEN: The spleen is normal in size.   OTHER: The abdominal aorta is normal in size.  The juxtahepatic IVC is within normal limits. Moderate volume of ascites.      Impression:  1. Cirrhotic morphology to liver. 2. Moderate ascites.  This report was finalized on 1/30/2018 11:35 AM by Dr. Arnie Parson MD.      Xr Chest 1 View    Result Date: 1/30/2018  Narrative: INDICATION: PICC placement  COMPARISON:  12/16/2016  FINDINGS: Single portable AP view of the chest.   Right PICC terminates over the cavoatrial junction.  Heart and mediastinal contours are normal. The lungs are clear. No pneumothorax or pleural effusion.      Impression: Right PICC terminates over the cavoatrial junction.   Initial interpretation provided by Dr. Isidro Singleton at 17:35 on 01/29/2018.  This report was finalized on 1/30/2018 6:44 AM by Dr. Arnie Parson MD.      Ct Upper Extremity Left With Contrast    Result Date: 1/30/2018  Narrative: CT UPPER EXTREMITY LEFT W CONTRAST-: 1/30/2018 2:13 PM  INDICATION:  Open wound overlying the olecranon. Osteomyelitis.  TECHNIQUE: CT of the left elbow with contrast. Coronal and sagittal reconstructions were obtained.  Radiation dose reduction techniques included automated exposure control or exposure modulation based on body size. Radiation audit for number of CT and nuclear cardiology exams performed in the last year: 0.   COMPARISON:  Left elbow radiograph 01/22/2018.  FINDINGS: Focal phlegmonous collection overlying the olecranon measures 7 x 1.3 x 5 cm. No discrete drainable abscess is identified. There is an open soft tissue wound.. This abuts the posterior margin of the ulna. No osseous erosions are  identified in the olecranon. No elbow effusion. Articulation at the elbow is normal.  Superficial veins at the elbow are patent.      Impression:  1. Superficial phlegmonous collection posterior to the elbow measuring up to 7 cm. No discrete drainable fluid collections. 2. No osseous erosions to indicate osteomyelitis. 3. No elbow effusion.  This report was finalized on 1/30/2018 2:59 PM by Dr. Arnie Parson MD.      Allergies:  is allergic to lisinopril.    Gil  Gabapentin/oxycodone/temazepam 1/2018 per report of 1/27/18    Discharge Medications:   Wiley Winston   Home Medication Instructions CHERELLE:[id number]    Printed on:02/02/18 0741   Medication Information                      B Complex Vitamins (VITAMIN B COMPLEX) tablet  Take 100 mg by mouth Daily.             folic acid (FOLVITE) 1 MG tablet  Take 1 mg by mouth Daily.             furosemide (LASIX) 40 MG tablet  Take 1 tablet by mouth 2 (Two) Times a Day.             gabapentin (NEURONTIN) 600 MG tablet  Take 600 mg by mouth Every Night.             lactulose 20 GM/30ML solution solution  Take 30 mL by mouth 4 (Four) Times a Day.             pantoprazole (PROTONIX) 40 MG EC tablet  Take 40 mg by mouth Daily.             Prenatal Vit-Fe Fumarate-FA (PRENATAL, CLASSIC, VITAMIN) 28-0.8 MG tablet tablet  Take  by mouth Daily.             rifAXIMin (XIFAXAN) 550 MG tablet  Take 1 tablet by mouth Every 12 (Twelve) Hours.             spironolactone (ALDACTONE) 100 MG tablet  Take 2 tablets by mouth Daily.             tamsulosin (FLOMAX) 0.4 MG capsule 24 hr capsule  Take 1 capsule by mouth Every Night.             temazepam (RESTORIL) 15 MG capsule  Take 15 mg by mouth At Night As Needed for Sleep.             thiamine (VITAMIN B-1) 100 MG tablet  Take 100 mg by mouth Daily.             traZODone (DESYREL) 50 MG tablet  Take 50 mg by mouth Every Night.             zinc sulfate (ZINCATE) 220 (50 Zn) MG capsule  Take 1 capsule by mouth 2 (Two) Times a Day.      "          History of Present Illness: Taken from [facility] on admit:   \"As per Dr Pendleton's ED Note:  Narrative: The patient is a 53-year-old white male who presents as noted above.  The patient is currently being treated with antibiotics for an open left elbow wound.  This was suffered in November and the patient has had multiple rounds of antibiotics, including IV antibiotics, and is currently on clindamycin.  Diarrhea began 3 days ago and the patient is having approximately 10 stools per day.  Patient has been able to drink some fluids, but unable to eat any significant solid foods.  No fever.  The patient is now too weak to ambulate and the wife states that the patient does have periods where he says things that make sense and appears confused.      Patient presented to Ed with diarrhea and weakness. His speech is slow and unclear at times. Injury to Lt elbow occurred 3 months ago. He has HTN and GERD but does not have any hx of diabetes. Patient denies any sx of fever, headache, chest pain, shortness of breath, abdominal pain, nausea/ vomiting, dysuria, urgency/ frequency or any recent hx of blood in stool. No other medical history available.\"    Hospital Course  1. Septic Bursitis of left elbow: ortho/surgery following  Elbow injury occurred 3-4 months ago with a chronic open wound treated with multiple antibiotics/dressing changes without healing.   Previously scheduled to see Dr Khalil outpatient, plan f/u appointment outpatient  Wound culture no growth at 3 days  final result, off antibiotics x 2 days (initially on Vancomycin/Zosyn)  BC x 2 NG <24 hours  D/W Dr. Sellers, antibiotics discontinued yesterday as cultures are negative  Recommend continued wound care, packing with iodoform and dressing changes daily with close monitoring by wound care providers at [facility]  F/U Dr. Khalil 1 week  F/U Dr. Sellers 3 weeks  F/U Debi Dodson MD 1 week after discharge from facility     2. Alcoholic " Cirrhosis/Hepatitis/ESLD:  3. Anasarca/Ascites/hyponatremia: GI following  4. 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 Caceres  -Plan is to consult [facility] to discuss possible end of life care options  -Transfer to SNF today for continued rehabilitation  -Obtain outpatient appointment with [facility] Liver Transplant team to evaluate option for transplant  New patient appointment [facility] Liver transplant asap  F/U Dr. Caceres 2 weeks      5. Diarrhea: improved, C. Difficile negative, on lactulose with loose stools as expected      6. Electrolyte imbalance:   Received electrolyte replacement protocols for potassium, phosphorus and magnesium while inpatient  Monitor at intervals, stable      7. Chronic anemia, thrombocytopenia, coagulopathy:  Secondary to ESLD and chronic alcoholic hepatitis  Continues on folic acid/thiamine  Hemoglobin stable at 8.5, without active blood loss  Monitor at intervals       8. Hypertension: remained at goal on medications as above      9. BPH: no acute issues on Flomax      10. GERD: no acute issues on Protonix      11. Tobacco abuse: no acute issues      12. Chronic back pain:   PT/OT ongoing  Continued on Neurontin 600 mg nightly with good effect    13. ANDREW: home CPAP without issues    14.  Obesity: Body mass index is 38.27 kg/(m^2).    Last Lab Results:   Lab Results (most recent)     Procedure Component Value Units Date/Time    Protime-INR [[id number]]  (Abnormal) Collected:  01/27/18 0839    Specimen:  Blood Updated:  01/27/18 0856     Protime 24.4 (H) Seconds      INR 2.16 (H)    Narrative:       Therapeutic Ranges for INR: 2.0-3.0 (PT 20-30)                              2.5-3.5 (PT 25-34)    aPTT [[id number]]  (Abnormal) Collected:  01/27/18 0839    Specimen:  Blood Updated:  01/27/18 0856     PTT 43.4 (H) seconds     Narrative:       PTT = The equivalent PTT values for the therapeutic range of heparin levels at 0.1 to 0.7 U/ml are 53 to 110 seconds.    CBC Auto Differential [[id number]]  (Abnormal) Collected:  01/27/18 0839    Specimen:  Blood Updated:  01/27/18 0857     WBC 6.86 10*3/mm3      RBC 2.97 (L) 10*6/mm3      Hemoglobin 9.7 (L) g/dL      Hematocrit 26.9 (L) %      MCV 90.6 fL      MCH 32.7 (H) pg      MCHC 36.1 g/dL      RDW 19.0 (H) %      RDW-SD 63.6 (H) fl      MPV 9.5 fL      Platelets 76 (L) 10*3/mm3      Neutrophil % 45.2 %      Lymphocyte % 31.3 %      Monocyte % 20.4 (H) %      Eosinophil % 2.3 %      Basophil % 0.4 %      Immature Grans % 0.4 %      Neutrophils, Absolute 3.09 10*3/mm3      Lymphocytes, Absolute 2.15 10*3/mm3      Monocytes, Absolute 1.40 (H) 10*3/mm3      Eosinophils, Absolute 0.16 10*3/mm3      Basophils, Absolute 0.03 10*3/mm3      Immature Grans, Absolute 0.03 10*3/mm3      nRBC 0.0 /100 WBC     Ammonia [[id number]]  (Normal) Collected:  01/27/18 0839    Specimen:  Blood Updated:  01/27/18 0902     Ammonia 60 umol/L     Comprehensive Metabolic Panel [[id number]]  (Abnormal) Collected:  01/27/18 0839    Specimen:  Blood Updated:  01/27/18 0903     Glucose 134 (H) mg/dL      BUN 15 mg/dL      Creatinine 1.19 mg/dL      Sodium 125 (L) mmol/L      Potassium 2.5 (L) mmol/L      Chloride 90 (L) mmol/L      CO2 23.8 mmol/L      Calcium 7.6 (L) mg/dL      Total Protein  6.1 g/dL      Albumin 2.20 (L) g/dL      ALT (SGPT) 29 U/L      AST (SGOT) 61 (H) U/L      Alkaline Phosphatase 207 (H) U/L      Total Bilirubin 5.0 (H) mg/dL      eGFR Non African Amer 64 mL/min/1.73      Globulin 3.9 gm/dL      A/G Ratio 0.6 g/dL      BUN/Creatinine Ratio 12.6     Anion Gap 11.2 mmol/L     Lactic Acid, Plasma [[id number]]  (Abnormal) Collected:  01/27/18 0839    Specimen:  Blood Updated:  01/27/18 0905     Lactate 2.1 (C) mmol/L     CBC & Differential [[id number]] Collected:  01/27/18 0839    Specimen:  Blood Updated:  01/27/18 0930    Narrative:       The following orders were created for panel order CBC & Differential.  Procedure                               Abnormality         Status                     ---------                               -----------         ------                     Scan Slide[[id number]]                                       Final result               CBC Auto Differential[[id number]]        Abnormal            Final result                 Please view results for these tests on the individual orders.    Scan Slide [[id number]] Collected:  01/27/18 0839    Specimen:  Blood Updated:  01/27/18 0930     Anisocytosis Slight/1+     Hypochromia Slight/1+     Macrocytes Slight/1+     Target Cells Slight/1+     WBC Morphology Normal     Platelet Estimate Decreased     Large Platelets --      OCC        Urinalysis With / Culture If Indicated - Urine, Catheter [[id number]]  (Abnormal) Collected:  01/27/18 0934    Specimen:  Urine from Urine, Clean Catch Updated:  01/27/18 0954     Color, UA Other (A)     Appearance, UA Clear     pH, UA 6.0     Specific Gravity, UA 1.010     Glucose, UA Negative     Ketones, UA Negative     Bilirubin, UA Small (1+) (A)     Blood, UA Negative     Protein, UA Negative     Leuk Esterase, UA Negative     Nitrite, UA Negative     Urobilinogen, UA 0.2 E.U./dL    Narrative:       Urine microscopic not indicated.    Influenza Antigen, Rapid - Swab, Nasopharynx  [[id number]]  (Normal) Collected:  01/27/18 1054    Specimen:  Swab from Nasopharynx Updated:  01/27/18 1123     Influenza A Ag, EIA Negative     Influenza B Ag, EIA Negative    Clostridium Difficile Toxin - Stool, Per Rectum [[id number]] Collected:  01/27/18 1054    Specimen:  Stool from Per Rectum Updated:  01/27/18 1212    Narrative:       The following orders were created for panel order Clostridium Difficile Toxin - Stool, Per Rectum.  Procedure                               Abnormality         Status                     ---------                               -----------         ------                     Clostridium Difficile EI...[[id number]]  Normal              Final result                 Please view results for these tests on the individual orders.    Clostridium Difficile EIA - Stool, Per Rectum [[id number]]  (Normal) Collected:  01/27/18 1054    Specimen:  Stool from Per Rectum Updated:  01/27/18 1212     C Diff GDH / Toxin Negative    Lactic Acid, Reflex Timer (This will reflex a repeat order 3-3:15 hours after ordered.) [[id number]] Collected:  01/27/18 0839    Specimen:  Blood Updated:  01/27/18 1216     Extra Tube Hold for add-ons.      Auto resulted.       Sedimentation Rate [[id number]]  (Abnormal) Collected:  01/27/18 1213    Specimen:  Blood Updated:  01/27/18 1224     Sed Rate 55 (H) mm/hr     Lactic Acid, Reflex [[id number]]  (Normal) Collected:  01/27/18 1230    Specimen:  Blood Updated:  01/27/18 1254     Lactate 1.6 mmol/L     Basic Metabolic Panel [[id number]]  (Abnormal) Collected:  01/27/18 2030    Specimen:  Blood Updated:  01/27/18 2053     Glucose 174 (H) mg/dL      BUN 17 mg/dL      Creatinine 1.43 (H) mg/dL      Sodium 126 (L) mmol/L      Potassium 2.7 (L) mmol/L      Chloride 91 (L) mmol/L      CO2 24.2 mmol/L      Calcium 7.7 (L) mg/dL      eGFR Non African Amer 52 (L) mL/min/1.73      BUN/Creatinine Ratio 11.9     Anion Gap 10.8 mmol/L     Narrative:       GFR Normal >60  Chronic Kidney  Disease <60  Kidney Failure <15    C-reactive Protein [[id number]]  (Abnormal) Collected:  01/27/18 1741    Specimen:  Blood Updated:  01/28/18 0819     C-Reactive Protein 2.86 (H) mg/dL     CBC Auto Differential [[id number]]  (Abnormal) Collected:  01/28/18 1341    Specimen:  Blood Updated:  01/28/18 1346     WBC 8.57 10*3/mm3      RBC 2.96 (L) 10*6/mm3      Hemoglobin 9.7 (L) g/dL      Hematocrit 27.1 (L) %      MCV 91.6 fL      MCH 32.8 (H) pg      MCHC 35.8 g/dL      RDW 18.8 (H) %      RDW-SD 62.3 (H) fl      MPV 10.2 fL      Platelets 75 (L) 10*3/mm3      Neutrophil % 49.6 %      Lymphocyte % 33.3 %      Monocyte % 13.9 (H) %      Eosinophil % 1.9 %      Basophil % 0.6 %      Immature Grans % 0.7 (H) %      Neutrophils, Absolute 4.26 10*3/mm3      Lymphocytes, Absolute 2.85 10*3/mm3      Monocytes, Absolute 1.19 (H) 10*3/mm3      Eosinophils, Absolute 0.16 10*3/mm3      Basophils, Absolute 0.05 10*3/mm3      Immature Grans, Absolute 0.06 (H) 10*3/mm3      nRBC 0.0 /100 WBC     Phosphorus [[id number]]  (Abnormal) Collected:  01/28/18 1341    Specimen:  Blood Updated:  01/28/18 1402     Phosphorus 2.5 (L) mg/dL     Magnesium [[id number]]  (Normal) Collected:  01/28/18 1341    Specimen:  Blood Updated:  01/28/18 1402     Magnesium 1.7 mg/dL     Comprehensive Metabolic Panel [[id number]]  (Abnormal) Collected:  01/28/18 1341    Specimen:  Blood Updated:  01/28/18 1402     Glucose 158 (H) mg/dL      BUN 18 mg/dL      Creatinine 1.44 (H) mg/dL      Sodium 127 (L) mmol/L      Potassium 3.4 (L) mmol/L      Chloride 92 (L) mmol/L      CO2 22.3 mmol/L      Calcium 7.7 (L) mg/dL      Total Protein 6.0 g/dL      Albumin 2.10 (L) g/dL      ALT (SGPT) 29 U/L      AST (SGOT) 60 (H) U/L      Alkaline Phosphatase 215 (H) U/L      Total Bilirubin 5.0 (H) mg/dL      eGFR Non African Amer 52 (L) mL/min/1.73      Globulin 3.9 gm/dL      A/G Ratio 0.5 g/dL      BUN/Creatinine Ratio 12.5     Anion Gap 12.7 mmol/L     Phosphorus  [[id number]]  (Abnormal) Collected:  01/29/18 0820    Specimen:  Blood Updated:  01/29/18 0844     Phosphorus 2.4 (L) mg/dL     Magnesium [[id number]]  (Normal) Collected:  01/29/18 0820    Specimen:  Blood Updated:  01/29/18 0844     Magnesium 1.7 mg/dL     Potassium [[id number]]  (Normal) Collected:  01/29/18 0820    Specimen:  Blood Updated:  01/29/18 0844     Potassium 4.0 mmol/L     Phosphorus [[id number]]  (Normal) Collected:  01/30/18 0015    Specimen:  Blood Updated:  01/30/18 0055     Phosphorus 2.7 mg/dL     Vancomycin, Random [[id number]]  (Normal) Collected:  01/30/18 0015    Specimen:  Blood Updated:  01/30/18 0055     Vancomycin Random 6.30 mcg/mL     CBC & Differential [[id number]] Collected:  01/30/18 0532    Specimen:  Blood Updated:  01/30/18 0555    Narrative:       The following orders were created for panel order CBC & Differential.  Procedure                               Abnormality         Status                     ---------                               -----------         ------                     CBC Auto Differential[[id number]]        Abnormal            Final result                 Please view results for these tests on the individual orders.    CBC Auto Differential [[id number]]  (Abnormal) Collected:  01/30/18 0532    Specimen:  Blood Updated:  01/30/18 0555     WBC 8.80 10*3/mm3      RBC 2.81 (L) 10*6/mm3      Hemoglobin 9.1 (L) g/dL      Hematocrit 25.5 (L) %      MCV 90.7 fL      MCH 32.4 (H) pg      MCHC 35.7 g/dL      RDW 19.1 (H) %      RDW-SD 63.4 (H) fl      MPV 9.8 fL      Platelets 77 (L) 10*3/mm3      Neutrophil % 43.0 (L) %      Lymphocyte % 40.8 %      Monocyte % 13.1 (H) %      Eosinophil % 1.8 %      Basophil % 0.7 %      Immature Grans % 0.6 (H) %      Neutrophils, Absolute 3.79 10*3/mm3      Lymphocytes, Absolute 3.59 10*3/mm3      Monocytes, Absolute 1.15 (H) 10*3/mm3      Eosinophils, Absolute 0.16 10*3/mm3      Basophils, Absolute 0.06 10*3/mm3      Immature Grans,  Absolute 0.05 (H) 10*3/mm3      nRBC 0.0 /100 WBC     Ammonia [[id number]]  (Abnormal) Collected:  01/30/18 0532    Specimen:  Blood Updated:  01/30/18 0604     Ammonia 76 (H) umol/L     Magnesium [[id number]]  (Normal) Collected:  01/30/18 0532    Specimen:  Blood Updated:  01/30/18 0616     Magnesium 1.9 mg/dL     Basic Metabolic Panel [[id number]]  (Abnormal) Collected:  01/30/18 0532    Specimen:  Blood Updated:  01/30/18 0624     Glucose 101 (H) mg/dL      BUN 17 mg/dL      Creatinine 1.20 mg/dL      Sodium 131 (L) mmol/L      Potassium 4.0 mmol/L      Chloride 98 mmol/L      CO2 22.2 mmol/L      Calcium 7.5 (L) mg/dL      eGFR Non African Amer 64 mL/min/1.73      BUN/Creatinine Ratio 14.2     Anion Gap 10.8 mmol/L     Narrative:       GFR Normal >60  Chronic Kidney Disease <60  Kidney Failure <15    Potassium, Urine, Random - Urine, Clean Catch [[id number]] Collected:  01/31/18 1614    Specimen:  Urine from Urine, Clean Catch Updated:  01/31/18 1641     Potassium, Urine 22.1 mmol/L     Sodium, Urine, Random - Urine, Clean Catch [[id number]] Collected:  01/31/18 1614    Specimen:  Urine from Urine, Clean Catch Updated:  01/31/18 1641     Sodium, Urine 34 mmol/L     Vancomycin, Random [[id number]]  (Normal) Collected:  01/31/18 1652    Specimen:  Blood Updated:  01/31/18 1739     Vancomycin Random 27.90 mcg/mL     Protime-INR [[id number]]  (Abnormal) Collected:  02/01/18 0354    Specimen:  Blood Updated:  02/01/18 0422     Protime 25.0 (H) Seconds      INR 2.22 (H)    Narrative:       Therapeutic Ranges for INR: 2.0-3.0 (PT 20-30)                              2.5-3.5 (PT 25-34)    Comprehensive Metabolic Panel [[id number]]  (Abnormal) Collected:  02/01/18 0354    Specimen:  Blood Updated:  02/01/18 0438     Glucose 112 (H) mg/dL      BUN 13 mg/dL      Creatinine 1.15 mg/dL      Sodium 130 (L) mmol/L      Potassium 3.6 mmol/L      Chloride 97 (L) mmol/L      CO2 23.5 mmol/L      Calcium 7.6 (L) mg/dL      Total Protein  5.7 (L) g/dL      Albumin 2.00 (L) g/dL      ALT (SGPT) 32 U/L      AST (SGOT) 61 (H) U/L      Alkaline Phosphatase 177 (H) U/L      Total Bilirubin 3.9 (H) mg/dL      eGFR Non African Amer 67 mL/min/1.73      Globulin 3.7 gm/dL      A/G Ratio 0.5 g/dL      BUN/Creatinine Ratio 11.3     Anion Gap 9.5 mmol/L     Ammonia [[id number]]  (Abnormal) Collected:  02/01/18 0354    Specimen:  Blood Updated:  02/01/18 0439     Ammonia 91 (H) umol/L     CBC Auto Differential [[id number]]  (Abnormal) Collected:  02/01/18 0354    Specimen:  Blood Updated:  02/01/18 0556     WBC 7.74 10*3/mm3      RBC 2.67 (L) 10*6/mm3      Hemoglobin 8.8 (L) g/dL      Hematocrit 24.6 (L) %      MCV 92.1 fL      MCH 33.0 (H) pg      MCHC 35.8 g/dL      RDW 19.0 (H) %      RDW-SD 63.4 (H) fl      MPV 10.3 fL      Platelets 57 (L) 10*3/mm3      Neutrophil % 49.1 %      Lymphocyte % 33.1 %      Monocyte % 13.8 (H) %      Eosinophil % 2.8 %      Basophil % 0.6 %      Immature Grans % 0.6 (H) %      Neutrophils, Absolute 3.79 10*3/mm3      Lymphocytes, Absolute 2.56 10*3/mm3      Monocytes, Absolute 1.07 (H) 10*3/mm3      Eosinophils, Absolute 0.22 10*3/mm3      Basophils, Absolute 0.05 10*3/mm3      Immature Grans, Absolute 0.05 (H) 10*3/mm3      nRBC 0.3 (H) /100 WBC     CBC & Differential [[id number]] Collected:  02/01/18 0354    Specimen:  Blood Updated:  02/01/18 0627    Narrative:       The following orders were created for panel order CBC & Differential.  Procedure                               Abnormality         Status                     ---------                               -----------         ------                     Scan Slide[[id number]]                                       Final result               CBC Auto Differential[[id number]]        Abnormal            Final result                 Please view results for these tests on the individual orders.    Scan Slide [[id number]] Collected:  02/01/18 0354    Specimen:  Blood Updated:  02/01/18  0627     Poikilocytes Slight/1+     Target Cells Slight/1+     WBC Morphology Normal     Platelet Morphology Normal    Wound Culture - Surgical Site, Arm, Left [[id number]]  (Normal) Collected:  01/29/18 0104    Specimen:  Surgical Site from Arm, Left Updated:  02/01/18 0727     Wound Culture No growth at 3 days     Gram Stain Result Few (2+) WBCs seen      No organisms seen    AFP Tumor Marker [[id number]] Collected:  01/31/18 0605    Specimen:  Blood Updated:  02/01/18 0822     AFP Tumor Marker 4.7 ng/mL       Roche ECLIA methodology       Narrative:       Performed at:  [address] [place] [address]  [id number]  : Jayden Alba PhD, Phone:  [phone]    Blood Culture - Blood, [[id number]]  (Normal) Collected:  02/01/18 0840    Specimen:  Blood from Blood, Central Line Updated:  02/01/18 2046     Blood Culture No growth at less than 24 hours    Blood Culture - Blood, [[id number]]  (Normal) Collected:  02/01/18 0930    Specimen:  Blood from Hand, Left Updated:  02/01/18 2146     Blood Culture No growth at less than 24 hours    Vancomycin, Random [[id number]]  (Normal) Collected:  02/02/18 0439    Specimen:  Blood Updated:  02/02/18 0539     Vancomycin Random 17.90 mcg/mL     Comprehensive Metabolic Panel [[id number]]  (Abnormal) Collected:  02/02/18 0439    Specimen:  Blood Updated:  02/02/18 0540     Glucose 99 mg/dL      BUN 11 mg/dL      Creatinine 0.92 mg/dL      Sodium 133 (L) mmol/L      Potassium 3.9 mmol/L      Chloride 101 mmol/L      CO2 26.6 mmol/L      Calcium 7.8 (L) mg/dL      Total Protein 5.8 (L) g/dL      Albumin 2.00 (L) g/dL      ALT (SGPT) 32 U/L      AST (SGOT) 62 (H) U/L      Alkaline Phosphatase 168 (H) U/L      Total Bilirubin 3.8 (H) mg/dL      eGFR Non African Amer 86 mL/min/1.73      Globulin 3.8 gm/dL      A/G Ratio 0.5 g/dL      BUN/Creatinine Ratio 12.0     Anion Gap 5.4 mmol/L     CBC & Differential [[id number]] Collected:  02/02/18 0439    Specimen:   Blood Updated:  02/02/18 0547    Narrative:       The following orders were created for panel order CBC & Differential.  Procedure                               Abnormality         Status                     ---------                               -----------         ------                     Scan Slide[[id number]]                                                                  CBC Auto Differential[[id number]]        Abnormal            Final result                 Please view results for these tests on the individual orders.    CBC Auto Differential [[id number]]  (Abnormal) Collected:  02/02/18 0439    Specimen:  Blood Updated:  02/02/18 0547     WBC 6.74 10*3/mm3      RBC 2.56 (L) 10*6/mm3      Hemoglobin 8.5 (L) g/dL      Hematocrit 23.7 (C) %      MCV 92.6 fL      MCH 33.2 (H) pg      MCHC 35.9 g/dL      RDW 19.1 (H) %      RDW-SD 64.8 (H) fl      MPV 9.9 fL      Platelets 52 (L) 10*3/mm3      Neutrophil % 41.4 (L) %      Lymphocyte % 37.1 %      Monocyte % 18.0 (H) %      Eosinophil % 2.2 %      Basophil % 0.7 %      Immature Grans % 0.6 (H) %      Neutrophils, Absolute 2.79 10*3/mm3      Lymphocytes, Absolute 2.50 10*3/mm3      Monocytes, Absolute 1.21 (H) 10*3/mm3      Eosinophils, Absolute 0.15 10*3/mm3      Basophils, Absolute 0.05 10*3/mm3      Immature Grans, Absolute 0.04 (H) 10*3/mm3      nRBC 0.0 /100 WBC     Protime-INR [[id number]]  (Abnormal) Collected:  02/02/18 0439    Specimen:  Blood Updated:  02/02/18 0553     Protime 25.8 (H) Seconds      INR 2.31 (H)    Narrative:       Therapeutic Ranges for INR: 2.0-3.0 (PT 20-30)                              2.5-3.5 (PT 25-34)    Ammonia [[id number]]  (Abnormal) Collected:  02/02/18 0438    Specimen:  Blood Updated:  02/02/18 0600     Ammonia 64 (H) umol/L     Procalcitonin [[id number]]  (Normal) Collected:  02/02/18 0439    Specimen:  Blood Updated:  02/02/18 0619     Procalcitonin 0.18 ng/mL     Narrative:       As a Marker for Sepsis (Non-Neonates):   1.  <0.5 ng/mL represents a low risk of severe sepsis and/or septic shock.  2. >2 ng/mL represents a high risk of severe sepsis and/or septic shock.    As a Marker for Lower Respiratory Tract Infections that require antibiotic therapy:    PCT on Admission     Antibiotic Therapy       6-12 Hrs later  > 0.5                Strongly Recommended             >0.25 - <0.5         Recommended  0.1 - 0.25           Discouraged              Remeasure/reassess PCT  <0.1                 Strongly Discouraged     Remeasure/reassess PCT                     PCT values of < 0.5 ng/mL do not exclude an infection, because localized infections (without systemic signs) may be associated with such low concentrations, or a systemic infection in its initial stages (< 6 hours). Furthermore, increased PCT can occur without infection. PCT concentrations between 0.5 and 2.0 ng/mL should be interpreted taking into account the patient's history. It is recommended to retest PCT within 6-24 hours if any concentrations < 2 ng/mL are obtained.        Imaging Results (most recent)     Procedure Component Value Units Date/Time    XR Chest 1 View [[id number]] Collected:  01/30/18 0643     Updated:  01/30/18 0646    Narrative:       INDICATION:   PICC placement      COMPARISON:    12/16/2016     FINDINGS:  Single portable AP view of the chest.       Right PICC terminates over the cavoatrial junction.     Heart and mediastinal contours are normal. The lungs are clear. No  pneumothorax or pleural effusion.       Impression:       Right PICC terminates over the cavoatrial junction.        Initial interpretation provided by Dr. Isidro Singleton at 17:35 on  01/29/2018.     This report was finalized on 1/30/2018 6:44 AM by Dr. Arnie Parson MD.       US Abdomen Complete [[id number]] Collected:  01/30/18 1132     Updated:  01/30/18 1137    Narrative:       US ABDOMEN COMPLETE-: 1/30/2018 10:04 AM     INDICATION:   Cirrhosis. Ascites. Confusion     COMPARISON:    Right upper quadrant ultrasound 08/06/2013     FINDINGS:  PANCREAS: The pancreas is obscured due to overlying bowel gas.     LIVER: Coarsened echotexture of the hepatic parenchyma is indicative of  background cirrhosis.. No hepatic mass. The intrahepatic bile ducts are  normal in caliber. The common duct is not visualized at the glenna  hepatis.     GALLBLADDER: The gallbladder is normal. No gallstones.     KIDNEYS: The right kidney measures 11.2 cm. The left kidney measures  11.8 cm.  Renal cortical thickness and echogenicity is normal. No  hydronephrosis.     SPLEEN: The spleen is normal in size.       OTHER: The abdominal aorta is normal in size.  The juxtahepatic IVC is  within normal limits. Moderate volume of ascites.       Impression:          1. Cirrhotic morphology to liver.  2. Moderate ascites.     This report was finalized on 1/30/2018 11:35 AM by Dr. Arnie Parson MD.       CT Upper Extremity Left With Contrast [[id number]] Collected:  01/30/18 1455     Updated:  01/30/18 1501    Narrative:       CT UPPER EXTREMITY LEFT W CONTRAST-: 1/30/2018 2:13 PM     INDICATION:    Open wound overlying the olecranon. Osteomyelitis.     TECHNIQUE:   CT of the left elbow with contrast. Coronal and sagittal reconstructions  were obtained.  Radiation dose reduction techniques included automated  exposure control or exposure modulation based on body size. Radiation  audit for number of CT and nuclear cardiology exams performed in the  last year: 0.       COMPARISON:    Left elbow radiograph 01/22/2018.     FINDINGS:  Focal phlegmonous collection overlying the olecranon measures 7 x 1.3 x  5 cm. No discrete drainable abscess is identified. There is an open soft  tissue wound.. This abuts the posterior margin of the ulna. No osseous  erosions are identified in the olecranon. No elbow effusion.  Articulation at the elbow is normal.     Superficial veins at the elbow are patent.       Impression:          1. Superficial  phlegmonous collection posterior to the elbow measuring  up to 7 cm. No discrete drainable fluid collections.  2. No osseous erosions to indicate osteomyelitis.  3. No elbow effusion.     This report was finalized on 2018 2:59 PM by Dr. Arnie Parson MD.           PROCEDURES: NONE    Condition on Discharge:  stable    Physical Exam at Discharge  Vital Signs  Temp:  [97.3 °F (36.3 °C)-97.7 °F (36.5 °C)] 97.6 °F (36.4 °C)  Heart Rate:  [] 95  Resp:  [18-20] 20  BP: (114-149)/(64-92) 137/69    Physical Exam:  Physical Exam   Constitutional: Patient appears well-developed and well-nourished and in no acute distress, obese, jaundiced   HEENT:   Head: Normocephalic and atraumatic.   Eyes:  Pupils are equal, round, and reactive to light. EOM are intact. Sclera are anicteric and non-injected.  Mouth and Throat: Patient has moist mucous membranes. Oropharynx is clear of any erythema or exudate.     Neck: Neck supple. No JVD present. No thyromegaly present. No lymphadenopathy present.  Cardiovascular: Regular rate, regular rhythm, S1 normal and S2 normal.  Exam reveals no gallop and no friction rub.  No murmur heard.  Pulmonary/Chest: Lungs clear but diminished  Abdominal: Distended, obese, Soft. Bowel sounds are normal. There is no tenderness.   Musculoskeletal: unable to determine secondary to habitus  Extremities: 3+ pitting LE edema. Pulses are palpable in all 4 extremities.  Neurological: Patient is alert and oriented to person, place, . Cranial nerves II-XII are grossly intact with no focal deficits.  Skin: Left elbow with dressing clean, dry and intact. Skin is warm. No rash noted. Nails show no clubbing.  No cyanosis or erythema.    Discharge Disposition  Signature of Leno [facility]    Visiting Nurse:  24 hour supervision recommended by PT/OT  As per facility    Home PT/OT:  As per facility    Home Safety Evaluation:  As per facility    DME  Will need rolling walker, bedside commode    Discharge Diet:            Dietary Orders            Start     Ordered    01/31/18 1229  Diet Regular; Cardiac, Consistent Carbohydrate, Low Sodium; 2,000 mg Na  Diet Effective Now     Comments:  1500 ml/day fluid restriction   Question Answer Comment   Diet Texture / Consistency Regular    Common Modifiers Cardiac    Common Modifiers Consistent Carbohydrate    Common Modifiers Low Sodium    Low Sodium Restriction: 2,000 mg Na        01/31/18 1229        Activity at Discharge:  As tolerated    Pre-discharge education  Medications, dietary restrictions, follow up    Follow-up Appointments  No future appointments.  Additional Instructions for the Follow-ups that You Need to Schedule     Discharge Follow-up with PCP    As directed    Follow Up Details:  1 week after discharge from facility           Discharge Follow-up with Specialty: Dr. Sellers; 3 Weeks    As directed    Specialty:  Dr. Sellers    Follow Up:  3 Weeks           Discharge Follow-up with Specified Provider: Dr. Caceres; 2 Weeks    As directed    To:  Dr. Caceres    Follow Up:  2 Weeks           Discharge Follow-up with Specified Provider: Dr. Khalil; 1 Week    As directed    To:  Dr. Khalil    Follow Up:  1 Week           Discharge Follow-up with Specified Provider: [facility]    As directed    To:  [facility]    Follow Up Details:  ASAP                     Test Results Pending at Discharge: will need f/u by SNF provider   Order Current Status    Blood Culture - Blood, Preliminary result    Blood Culture - Blood, Preliminary result           Dorina Hudson, GARY  02/02/18  7:41 AM    Time: Discharge over 30 min (if over 30 minutes give explanation as to why it took greater than 30 minutes)  Secondary to:  Coordination of care/follow up  Medication reconciliation  Extensive discussion with patient and significant other                   Electronically signed by Ginny Lynn MD at 2/2/2018 12:52 PM        Discharge  Order     Start     Ordered    02/02/18 0734  Discharge patient  Once     Expected Discharge Date:  02/02/18    Expected Discharge Time:  Morning    Discharge Disposition:  Skilled Nursing Facility (DC - External)        02/02/18 0738

## 2018-02-06 LAB
BACTERIA SPEC AEROBE CULT: NORMAL
BACTERIA SPEC AEROBE CULT: NORMAL

## 2018-10-02 ENCOUNTER — TELEPHONE (OUTPATIENT)
Dept: SURGERY | Facility: CLINIC | Age: 53
End: 2018-10-02

## 2018-10-02 NOTE — TELEPHONE ENCOUNTER
Multiple attempts made to reach pt via phone    RE: repeat c-scope. NA/No VM.  Letter to PCP.    Dr. Soliz informed.